# Patient Record
Sex: FEMALE | Race: OTHER | NOT HISPANIC OR LATINO | URBAN - METROPOLITAN AREA
[De-identification: names, ages, dates, MRNs, and addresses within clinical notes are randomized per-mention and may not be internally consistent; named-entity substitution may affect disease eponyms.]

---

## 2018-01-04 ENCOUNTER — INPATIENT (INPATIENT)
Facility: HOSPITAL | Age: 83
LOS: 4 days | Discharge: NURSING, REHAB OR LONG-TERM CARE | DRG: 872 | End: 2018-01-09
Attending: INTERNAL MEDICINE | Admitting: INTERNAL MEDICINE
Payer: MEDICARE

## 2018-01-04 VITALS
WEIGHT: 95.02 LBS | RESPIRATION RATE: 22 BRPM | DIASTOLIC BLOOD PRESSURE: 55 MMHG | HEART RATE: 90 BPM | SYSTOLIC BLOOD PRESSURE: 95 MMHG

## 2018-01-04 DIAGNOSIS — N39.0 URINARY TRACT INFECTION, SITE NOT SPECIFIED: ICD-10-CM

## 2018-01-04 DIAGNOSIS — E87.0 HYPEROSMOLALITY AND HYPERNATREMIA: ICD-10-CM

## 2018-01-04 DIAGNOSIS — E03.9 HYPOTHYROIDISM, UNSPECIFIED: ICD-10-CM

## 2018-01-04 DIAGNOSIS — Z29.9 ENCOUNTER FOR PROPHYLACTIC MEASURES, UNSPECIFIED: ICD-10-CM

## 2018-01-04 DIAGNOSIS — F03.90 UNSPECIFIED DEMENTIA WITHOUT BEHAVIORAL DISTURBANCE: ICD-10-CM

## 2018-01-04 DIAGNOSIS — J45.909 UNSPECIFIED ASTHMA, UNCOMPLICATED: ICD-10-CM

## 2018-01-04 DIAGNOSIS — G20 PARKINSON'S DISEASE: ICD-10-CM

## 2018-01-04 LAB
ALBUMIN SERPL ELPH-MCNC: 2.8 G/DL — LOW (ref 3.5–5)
ALP SERPL-CCNC: 68 U/L — SIGNIFICANT CHANGE UP (ref 40–120)
ALT FLD-CCNC: 54 U/L DA — SIGNIFICANT CHANGE UP (ref 10–60)
ANION GAP SERPL CALC-SCNC: 6 MMOL/L — SIGNIFICANT CHANGE UP (ref 5–17)
APPEARANCE UR: ABNORMAL
AST SERPL-CCNC: 52 U/L — HIGH (ref 10–40)
BASOPHILS # BLD AUTO: 0.1 K/UL — SIGNIFICANT CHANGE UP (ref 0–0.2)
BASOPHILS NFR BLD AUTO: 0.7 % — SIGNIFICANT CHANGE UP (ref 0–2)
BILIRUB SERPL-MCNC: 0.7 MG/DL — SIGNIFICANT CHANGE UP (ref 0.2–1.2)
BILIRUB UR-MCNC: NEGATIVE — SIGNIFICANT CHANGE UP
BUN SERPL-MCNC: 21 MG/DL — HIGH (ref 7–18)
BUN SERPL-MCNC: 26 MG/DL — HIGH (ref 7–18)
CALCIUM SERPL-MCNC: 7.5 MG/DL — LOW (ref 8.4–10.5)
CALCIUM SERPL-MCNC: 8.1 MG/DL — LOW (ref 8.4–10.5)
CHLORIDE SERPL-SCNC: 141 MMOL/L — HIGH (ref 96–108)
CHLORIDE SERPL-SCNC: 143 MMOL/L — HIGH (ref 96–108)
CO2 SERPL-SCNC: 24 MMOL/L — SIGNIFICANT CHANGE UP (ref 22–31)
CO2 SERPL-SCNC: 27 MMOL/L — SIGNIFICANT CHANGE UP (ref 22–31)
COLOR SPEC: YELLOW — SIGNIFICANT CHANGE UP
CREAT SERPL-MCNC: 1.18 MG/DL — SIGNIFICANT CHANGE UP (ref 0.5–1.3)
CREAT SERPL-MCNC: 1.37 MG/DL — HIGH (ref 0.5–1.3)
DIFF PNL FLD: ABNORMAL
EOSINOPHIL # BLD AUTO: 0.1 K/UL — SIGNIFICANT CHANGE UP (ref 0–0.5)
EOSINOPHIL NFR BLD AUTO: 1.8 % — SIGNIFICANT CHANGE UP (ref 0–6)
GLUCOSE SERPL-MCNC: 102 MG/DL — HIGH (ref 70–99)
GLUCOSE SERPL-MCNC: 123 MG/DL — HIGH (ref 70–99)
GLUCOSE UR QL: NEGATIVE — SIGNIFICANT CHANGE UP
HCT VFR BLD CALC: 46.4 % — HIGH (ref 34.5–45)
HGB BLD-MCNC: 13.8 G/DL — SIGNIFICANT CHANGE UP (ref 11.5–15.5)
KETONES UR-MCNC: NEGATIVE — SIGNIFICANT CHANGE UP
LACTATE SERPL-SCNC: 1.7 MMOL/L — SIGNIFICANT CHANGE UP (ref 0.7–2)
LACTATE SERPL-SCNC: 2.5 MMOL/L — HIGH (ref 0.7–2)
LEUKOCYTE ESTERASE UR-ACNC: ABNORMAL
LYMPHOCYTES # BLD AUTO: 1.4 K/UL — SIGNIFICANT CHANGE UP (ref 1–3.3)
LYMPHOCYTES # BLD AUTO: 17.4 % — SIGNIFICANT CHANGE UP (ref 13–44)
MCHC RBC-ENTMCNC: 29.8 GM/DL — LOW (ref 32–36)
MCHC RBC-ENTMCNC: 29.8 PG — SIGNIFICANT CHANGE UP (ref 27–34)
MCV RBC AUTO: 99.9 FL — SIGNIFICANT CHANGE UP (ref 80–100)
MONOCYTES # BLD AUTO: 0.4 K/UL — SIGNIFICANT CHANGE UP (ref 0–0.9)
MONOCYTES NFR BLD AUTO: 5 % — SIGNIFICANT CHANGE UP (ref 2–14)
NEUTROPHILS # BLD AUTO: 6 K/UL — SIGNIFICANT CHANGE UP (ref 1.8–7.4)
NEUTROPHILS NFR BLD AUTO: 75.2 % — SIGNIFICANT CHANGE UP (ref 43–77)
NITRITE UR-MCNC: POSITIVE
PH UR: 6 — SIGNIFICANT CHANGE UP (ref 5–8)
PLATELET # BLD AUTO: 111 K/UL — LOW (ref 150–400)
POTASSIUM SERPL-MCNC: 2.9 MMOL/L — CRITICAL LOW (ref 3.5–5.3)
POTASSIUM SERPL-MCNC: 3.2 MMOL/L — LOW (ref 3.5–5.3)
POTASSIUM SERPL-SCNC: 2.9 MMOL/L — CRITICAL LOW (ref 3.5–5.3)
POTASSIUM SERPL-SCNC: 3.2 MMOL/L — LOW (ref 3.5–5.3)
PROT SERPL-MCNC: 7.2 G/DL — SIGNIFICANT CHANGE UP (ref 6–8.3)
PROT UR-MCNC: 100
RAPID RVP RESULT: SIGNIFICANT CHANGE UP
RBC # BLD: 4.64 M/UL — SIGNIFICANT CHANGE UP (ref 3.8–5.2)
RBC # FLD: 14.9 % — HIGH (ref 10.3–14.5)
SODIUM SERPL-SCNC: 174 MMOL/L — CRITICAL HIGH (ref 135–145)
SODIUM SERPL-SCNC: 176 MMOL/L — CRITICAL HIGH (ref 135–145)
SP GR SPEC: 1.01 — SIGNIFICANT CHANGE UP (ref 1.01–1.02)
UROBILINOGEN FLD QL: NEGATIVE — SIGNIFICANT CHANGE UP
WBC # BLD: 8 K/UL — SIGNIFICANT CHANGE UP (ref 3.8–10.5)
WBC # FLD AUTO: 8 K/UL — SIGNIFICANT CHANGE UP (ref 3.8–10.5)

## 2018-01-04 PROCEDURE — 99285 EMERGENCY DEPT VISIT HI MDM: CPT

## 2018-01-04 PROCEDURE — 71045 X-RAY EXAM CHEST 1 VIEW: CPT | Mod: 26

## 2018-01-04 RX ORDER — SODIUM CHLORIDE 9 MG/ML
900 INJECTION INTRAMUSCULAR; INTRAVENOUS; SUBCUTANEOUS ONCE
Qty: 0 | Refills: 0 | Status: COMPLETED | OUTPATIENT
Start: 2018-01-04 | End: 2018-01-04

## 2018-01-04 RX ORDER — HEPARIN SODIUM 5000 [USP'U]/ML
5000 INJECTION INTRAVENOUS; SUBCUTANEOUS EVERY 12 HOURS
Qty: 0 | Refills: 0 | Status: DISCONTINUED | OUTPATIENT
Start: 2018-01-04 | End: 2018-01-09

## 2018-01-04 RX ORDER — LEVOTHYROXINE SODIUM 125 MCG
25 TABLET ORAL DAILY
Qty: 0 | Refills: 0 | Status: DISCONTINUED | OUTPATIENT
Start: 2018-01-04 | End: 2018-01-09

## 2018-01-04 RX ORDER — TRAZODONE HCL 50 MG
0.5 TABLET ORAL
Qty: 0 | Refills: 0 | COMMUNITY

## 2018-01-04 RX ORDER — TRAZODONE HCL 50 MG
25 TABLET ORAL AT BEDTIME
Qty: 0 | Refills: 0 | Status: DISCONTINUED | OUTPATIENT
Start: 2018-01-04 | End: 2018-01-09

## 2018-01-04 RX ORDER — CARBIDOPA AND LEVODOPA 25; 100 MG/1; MG/1
1 TABLET ORAL
Qty: 0 | Refills: 0 | COMMUNITY

## 2018-01-04 RX ORDER — LEVOTHYROXINE SODIUM 125 MCG
1 TABLET ORAL
Qty: 0 | Refills: 0 | COMMUNITY

## 2018-01-04 RX ORDER — CALCIPOTRIENE 50 UG/G
1 CREAM TOPICAL
Qty: 0 | Refills: 0 | COMMUNITY

## 2018-01-04 RX ORDER — CARBIDOPA AND LEVODOPA 25; 100 MG/1; MG/1
1 TABLET ORAL THREE TIMES A DAY
Qty: 0 | Refills: 0 | Status: DISCONTINUED | OUTPATIENT
Start: 2018-01-04 | End: 2018-01-09

## 2018-01-04 RX ORDER — SODIUM CHLORIDE 9 MG/ML
1000 INJECTION, SOLUTION INTRAVENOUS
Qty: 0 | Refills: 0 | Status: DISCONTINUED | OUTPATIENT
Start: 2018-01-04 | End: 2018-01-05

## 2018-01-04 RX ORDER — MEMANTINE HYDROCHLORIDE 10 MG/1
5 TABLET ORAL
Qty: 0 | Refills: 0 | Status: DISCONTINUED | OUTPATIENT
Start: 2018-01-04 | End: 2018-01-09

## 2018-01-04 RX ORDER — MOMETASONE FUROATE 1 MG/G
1 CREAM TOPICAL
Qty: 0 | Refills: 0 | COMMUNITY

## 2018-01-04 RX ORDER — KETOCONAZOLE 20 MG/G
1 AEROSOL, FOAM TOPICAL
Qty: 0 | Refills: 0 | COMMUNITY

## 2018-01-04 RX ORDER — ALBUTEROL 90 UG/1
3 AEROSOL, METERED ORAL
Qty: 0 | Refills: 0 | COMMUNITY

## 2018-01-04 RX ORDER — IPRATROPIUM/ALBUTEROL SULFATE 18-103MCG
3 AEROSOL WITH ADAPTER (GRAM) INHALATION EVERY 6 HOURS
Qty: 0 | Refills: 0 | Status: DISCONTINUED | OUTPATIENT
Start: 2018-01-04 | End: 2018-01-09

## 2018-01-04 RX ORDER — MEMANTINE HYDROCHLORIDE 10 MG/1
1 TABLET ORAL
Qty: 0 | Refills: 0 | COMMUNITY

## 2018-01-04 RX ORDER — ACETAMINOPHEN 500 MG
650 TABLET ORAL ONCE
Qty: 0 | Refills: 0 | Status: COMPLETED | OUTPATIENT
Start: 2018-01-04 | End: 2018-01-04

## 2018-01-04 RX ORDER — PIPERACILLIN AND TAZOBACTAM 4; .5 G/20ML; G/20ML
3.38 INJECTION, POWDER, LYOPHILIZED, FOR SOLUTION INTRAVENOUS ONCE
Qty: 0 | Refills: 0 | Status: COMPLETED | OUTPATIENT
Start: 2018-01-04 | End: 2018-01-04

## 2018-01-04 RX ORDER — FLUOCINONIDE/EMOLLIENT BASE 0.05 %
1 CREAM (GRAM) TOPICAL
Qty: 0 | Refills: 0 | Status: DISCONTINUED | OUTPATIENT
Start: 2018-01-04 | End: 2018-01-09

## 2018-01-04 RX ORDER — POTASSIUM CHLORIDE 20 MEQ
40 PACKET (EA) ORAL EVERY 4 HOURS
Qty: 0 | Refills: 0 | Status: DISCONTINUED | OUTPATIENT
Start: 2018-01-04 | End: 2018-01-05

## 2018-01-04 RX ORDER — CEFTRIAXONE 500 MG/1
1 INJECTION, POWDER, FOR SOLUTION INTRAMUSCULAR; INTRAVENOUS EVERY 24 HOURS
Qty: 0 | Refills: 0 | Status: DISCONTINUED | OUTPATIENT
Start: 2018-01-04 | End: 2018-01-09

## 2018-01-04 RX ORDER — DEXTROSE MONOHYDRATE, SODIUM CHLORIDE, AND POTASSIUM CHLORIDE 50; .745; 4.5 G/1000ML; G/1000ML; G/1000ML
1000 INJECTION, SOLUTION INTRAVENOUS
Qty: 0 | Refills: 0 | Status: DISCONTINUED | OUTPATIENT
Start: 2018-01-04 | End: 2018-01-04

## 2018-01-04 RX ADMIN — SODIUM CHLORIDE 900 MILLILITER(S): 9 INJECTION INTRAMUSCULAR; INTRAVENOUS; SUBCUTANEOUS at 15:35

## 2018-01-04 RX ADMIN — Medication 40 MILLIEQUIVALENT(S): at 18:35

## 2018-01-04 RX ADMIN — CARBIDOPA AND LEVODOPA 1 TABLET(S): 25; 100 TABLET ORAL at 23:56

## 2018-01-04 RX ADMIN — Medication 650 MILLIGRAM(S): at 16:57

## 2018-01-04 RX ADMIN — SODIUM CHLORIDE 75 MILLILITER(S): 9 INJECTION, SOLUTION INTRAVENOUS at 22:46

## 2018-01-04 RX ADMIN — CEFTRIAXONE 100 GRAM(S): 500 INJECTION, POWDER, FOR SOLUTION INTRAMUSCULAR; INTRAVENOUS at 23:57

## 2018-01-04 RX ADMIN — Medication 25 MILLIGRAM(S): at 23:55

## 2018-01-04 RX ADMIN — PIPERACILLIN AND TAZOBACTAM 100 GRAM(S): 4; .5 INJECTION, POWDER, LYOPHILIZED, FOR SOLUTION INTRAVENOUS at 18:32

## 2018-01-04 NOTE — ED PROVIDER NOTE - MEDICAL DECISION MAKING DETAILS
Pt w/ hypoxia, below 90 on room air. Will admit for infectious workup and CT for head trauma. elevated sodium and chloride, elevated BUN/Cr. IV fluids given, rectal temperature elevated. levquin given.

## 2018-01-04 NOTE — H&P ADULT - PROBLEM SELECTOR PLAN 1
Patient presented with lethargy and weakness with UA positive for WBC 26-50 with many bacteria, positive nitrites and leucocyte esterase.   Lactate 2.5 s/p 900 ml NS bolus improved to 1.7  Continue Rocephin and follow urine cultures

## 2018-01-04 NOTE — ED PROVIDER NOTE - OBJECTIVE STATEMENT
89 y/o F pt sent from nursing home after falling out of her wheelchair today. Pt also noted to have low oxygen saturation for the past few days. Pt has dementia and is unable to provide further history. NKDA.

## 2018-01-04 NOTE — H&P ADULT - ASSESSMENT
89 F from [facility] with PMH of OA, b12 deficiency hypothyroid, asthma, depression, parkinsonism, alzheimer's psoriasis and hypernatremia was sent from NH to ED for lethargy and weakness from last few days, assoicated with poor appetite.

## 2018-01-04 NOTE — H&P ADULT - PROBLEM SELECTOR PLAN 2
Na is 174, secondary to dehydration secondary to sepsis  Free water deficit is 3L. Patient already received 900 ml NS bolus. Started D5 at 75 ml/hr as per medical attending. Follow BMP q8. Na is 174, secondary to dehydration secondary to sepsis  Free water deficit is 3L. Patient already received 900 ml NS bolus. Started D5 at 75 ml/hr as per medical attending. Follow BMP q8. Nephro: Dr Matamoros

## 2018-01-04 NOTE — ED PROVIDER NOTE - CARE PLAN
Principal Discharge DX:	Hypernatremia  Secondary Diagnosis:	Dehydration  Secondary Diagnosis:	UTI (urinary tract infection)

## 2018-01-04 NOTE — ED PROVIDER NOTE - SKIN, MLM
Skin normal color for race, warm, dry and intact. Skin tear to R upper arm, contusion to L forehead.

## 2018-01-04 NOTE — H&P ADULT - HISTORY OF PRESENT ILLNESS
89 F with PMH of OA, b12 defiency, hypothroid, asthma, depression, parkinsonism, alzeimers, psoriasis, hypernatraemia, 89 F from [facility] with PMH of OA, b12 deficiency hypothyroid, asthma, depression, parkinsonism, alzheimer's psoriasis and hypernatremia was sent from NH to ED for lethargy and weakness from last few days, assoicated with poor appetite. Patient was sitting in wheel chair and she fell off the chair. ROS is difficult to obtain as patient is AxO X0, not able to answer the questions. No evidence of fever, cough, constipation, diarrhea or pedal edema. Patient has bruises all over the body likely chronic. Sh: Unable to obtain. FH: Unable to obtain. In ED patient was found to have Na 174 s/p 900 ml NS bolus. Na increased to 176. UA is positive for UTI.

## 2018-01-05 DIAGNOSIS — N17.9 ACUTE KIDNEY FAILURE, UNSPECIFIED: ICD-10-CM

## 2018-01-05 DIAGNOSIS — E87.6 HYPOKALEMIA: ICD-10-CM

## 2018-01-05 DIAGNOSIS — N18.9 CHRONIC KIDNEY DISEASE, UNSPECIFIED: ICD-10-CM

## 2018-01-05 LAB
ALBUMIN SERPL ELPH-MCNC: 2.7 G/DL — LOW (ref 3.5–5)
ALP SERPL-CCNC: 68 U/L — SIGNIFICANT CHANGE UP (ref 40–120)
ALT FLD-CCNC: 20 U/L DA — SIGNIFICANT CHANGE UP (ref 10–60)
ANION GAP SERPL CALC-SCNC: 5 MMOL/L — SIGNIFICANT CHANGE UP (ref 5–17)
ANION GAP SERPL CALC-SCNC: 6 MMOL/L — SIGNIFICANT CHANGE UP (ref 5–17)
ANION GAP SERPL CALC-SCNC: 8 MMOL/L — SIGNIFICANT CHANGE UP (ref 5–17)
AST SERPL-CCNC: 52 U/L — HIGH (ref 10–40)
BASOPHILS # BLD AUTO: 0 K/UL — SIGNIFICANT CHANGE UP (ref 0–0.2)
BASOPHILS NFR BLD AUTO: 0.5 % — SIGNIFICANT CHANGE UP (ref 0–2)
BILIRUB SERPL-MCNC: 0.8 MG/DL — SIGNIFICANT CHANGE UP (ref 0.2–1.2)
BUN SERPL-MCNC: 16 MG/DL — SIGNIFICANT CHANGE UP (ref 7–18)
BUN SERPL-MCNC: 21 MG/DL — HIGH (ref 7–18)
BUN SERPL-MCNC: 25 MG/DL — HIGH (ref 7–18)
CALCIUM SERPL-MCNC: 7.7 MG/DL — LOW (ref 8.4–10.5)
CALCIUM SERPL-MCNC: 7.7 MG/DL — LOW (ref 8.4–10.5)
CALCIUM SERPL-MCNC: 7.8 MG/DL — LOW (ref 8.4–10.5)
CHLORIDE SERPL-SCNC: 138 MMOL/L — HIGH (ref 96–108)
CHLORIDE SERPL-SCNC: 140 MMOL/L — HIGH (ref 96–108)
CHLORIDE SERPL-SCNC: 141 MMOL/L — HIGH (ref 96–108)
CHOLEST SERPL-MCNC: 133 MG/DL — SIGNIFICANT CHANGE UP (ref 10–199)
CO2 SERPL-SCNC: 23 MMOL/L — SIGNIFICANT CHANGE UP (ref 22–31)
CO2 SERPL-SCNC: 26 MMOL/L — SIGNIFICANT CHANGE UP (ref 22–31)
CO2 SERPL-SCNC: 27 MMOL/L — SIGNIFICANT CHANGE UP (ref 22–31)
CREAT SERPL-MCNC: 1.05 MG/DL — SIGNIFICANT CHANGE UP (ref 0.5–1.3)
CREAT SERPL-MCNC: 1.3 MG/DL — SIGNIFICANT CHANGE UP (ref 0.5–1.3)
CREAT SERPL-MCNC: 1.37 MG/DL — HIGH (ref 0.5–1.3)
CULTURE RESULTS: SIGNIFICANT CHANGE UP
EOSINOPHIL # BLD AUTO: 0.2 K/UL — SIGNIFICANT CHANGE UP (ref 0–0.5)
EOSINOPHIL NFR BLD AUTO: 2.3 % — SIGNIFICANT CHANGE UP (ref 0–6)
GLUCOSE SERPL-MCNC: 109 MG/DL — HIGH (ref 70–99)
GLUCOSE SERPL-MCNC: 125 MG/DL — HIGH (ref 70–99)
GLUCOSE SERPL-MCNC: 140 MG/DL — HIGH (ref 70–99)
HBA1C BLD-MCNC: 6.2 % — HIGH (ref 4–5.6)
HCT VFR BLD CALC: 44.1 % — SIGNIFICANT CHANGE UP (ref 34.5–45)
HDLC SERPL-MCNC: 28 MG/DL — LOW (ref 40–125)
HGB BLD-MCNC: 13.2 G/DL — SIGNIFICANT CHANGE UP (ref 11.5–15.5)
LIPID PNL WITH DIRECT LDL SERPL: 70 MG/DL — SIGNIFICANT CHANGE UP
LYMPHOCYTES # BLD AUTO: 1.3 K/UL — SIGNIFICANT CHANGE UP (ref 1–3.3)
LYMPHOCYTES # BLD AUTO: 16.7 % — SIGNIFICANT CHANGE UP (ref 13–44)
MAGNESIUM SERPL-MCNC: 2.7 MG/DL — HIGH (ref 1.6–2.6)
MCHC RBC-ENTMCNC: 29.6 PG — SIGNIFICANT CHANGE UP (ref 27–34)
MCHC RBC-ENTMCNC: 29.9 GM/DL — LOW (ref 32–36)
MCV RBC AUTO: 99.1 FL — SIGNIFICANT CHANGE UP (ref 80–100)
MONOCYTES # BLD AUTO: 0.4 K/UL — SIGNIFICANT CHANGE UP (ref 0–0.9)
MONOCYTES NFR BLD AUTO: 4.5 % — SIGNIFICANT CHANGE UP (ref 2–14)
NEUTROPHILS # BLD AUTO: 6 K/UL — SIGNIFICANT CHANGE UP (ref 1.8–7.4)
NEUTROPHILS NFR BLD AUTO: 76 % — SIGNIFICANT CHANGE UP (ref 43–77)
OSMOLALITY SERPL: 364 MOS/KG — HIGH (ref 275–300)
PHOSPHATE SERPL-MCNC: 1.4 MG/DL — LOW (ref 2.5–4.5)
PLATELET # BLD AUTO: 106 K/UL — LOW (ref 150–400)
POTASSIUM SERPL-MCNC: 3.4 MMOL/L — LOW (ref 3.5–5.3)
POTASSIUM SERPL-MCNC: 3.7 MMOL/L — SIGNIFICANT CHANGE UP (ref 3.5–5.3)
POTASSIUM SERPL-MCNC: 3.9 MMOL/L — SIGNIFICANT CHANGE UP (ref 3.5–5.3)
POTASSIUM SERPL-SCNC: 3.4 MMOL/L — LOW (ref 3.5–5.3)
POTASSIUM SERPL-SCNC: 3.7 MMOL/L — SIGNIFICANT CHANGE UP (ref 3.5–5.3)
POTASSIUM SERPL-SCNC: 3.9 MMOL/L — SIGNIFICANT CHANGE UP (ref 3.5–5.3)
PROT SERPL-MCNC: 6.9 G/DL — SIGNIFICANT CHANGE UP (ref 6–8.3)
RBC # BLD: 4.45 M/UL — SIGNIFICANT CHANGE UP (ref 3.8–5.2)
RBC # FLD: 14.6 % — HIGH (ref 10.3–14.5)
SODIUM SERPL-SCNC: 169 MMOL/L — CRITICAL HIGH (ref 135–145)
SODIUM SERPL-SCNC: 171 MMOL/L — CRITICAL HIGH (ref 135–145)
SODIUM SERPL-SCNC: 174 MMOL/L — CRITICAL HIGH (ref 135–145)
SPECIMEN SOURCE: SIGNIFICANT CHANGE UP
TOTAL CHOLESTEROL/HDL RATIO MEASUREMENT: 4.8 RATIO — SIGNIFICANT CHANGE UP (ref 3.3–7.1)
TRIGL SERPL-MCNC: 175 MG/DL — HIGH (ref 10–149)
TSH SERPL-MCNC: 4.01 UU/ML — SIGNIFICANT CHANGE UP (ref 0.34–4.82)
VIT B12 SERPL-MCNC: 636 PG/ML — SIGNIFICANT CHANGE UP (ref 232–1245)
WBC # BLD: 7.9 K/UL — SIGNIFICANT CHANGE UP (ref 3.8–10.5)
WBC # FLD AUTO: 7.9 K/UL — SIGNIFICANT CHANGE UP (ref 3.8–10.5)

## 2018-01-05 PROCEDURE — 70450 CT HEAD/BRAIN W/O DYE: CPT | Mod: 26

## 2018-01-05 RX ORDER — SODIUM CHLORIDE 9 MG/ML
1000 INJECTION, SOLUTION INTRAVENOUS
Qty: 0 | Refills: 0 | Status: DISCONTINUED | OUTPATIENT
Start: 2018-01-05 | End: 2018-01-06

## 2018-01-05 RX ORDER — POTASSIUM CHLORIDE 20 MEQ
40 PACKET (EA) ORAL EVERY 4 HOURS
Qty: 0 | Refills: 0 | Status: COMPLETED | OUTPATIENT
Start: 2018-01-05 | End: 2018-01-05

## 2018-01-05 RX ORDER — POTASSIUM PHOSPHATE, MONOBASIC POTASSIUM PHOSPHATE, DIBASIC 236; 224 MG/ML; MG/ML
30 INJECTION, SOLUTION INTRAVENOUS ONCE
Qty: 0 | Refills: 0 | Status: COMPLETED | OUTPATIENT
Start: 2018-01-05 | End: 2018-01-05

## 2018-01-05 RX ADMIN — Medication 3 MILLILITER(S): at 10:03

## 2018-01-05 RX ADMIN — CEFTRIAXONE 100 GRAM(S): 500 INJECTION, POWDER, FOR SOLUTION INTRAMUSCULAR; INTRAVENOUS at 23:57

## 2018-01-05 RX ADMIN — HEPARIN SODIUM 5000 UNIT(S): 5000 INJECTION INTRAVENOUS; SUBCUTANEOUS at 06:27

## 2018-01-05 RX ADMIN — POTASSIUM PHOSPHATE, MONOBASIC POTASSIUM PHOSPHATE, DIBASIC 85 MILLIMOLE(S): 236; 224 INJECTION, SOLUTION INTRAVENOUS at 12:29

## 2018-01-05 RX ADMIN — MEMANTINE HYDROCHLORIDE 5 MILLIGRAM(S): 10 TABLET ORAL at 17:17

## 2018-01-05 RX ADMIN — MEMANTINE HYDROCHLORIDE 5 MILLIGRAM(S): 10 TABLET ORAL at 06:27

## 2018-01-05 RX ADMIN — SODIUM CHLORIDE 75 MILLILITER(S): 9 INJECTION, SOLUTION INTRAVENOUS at 19:28

## 2018-01-05 RX ADMIN — Medication 1 APPLICATION(S): at 06:27

## 2018-01-05 RX ADMIN — CARBIDOPA AND LEVODOPA 1 TABLET(S): 25; 100 TABLET ORAL at 22:00

## 2018-01-05 RX ADMIN — CARBIDOPA AND LEVODOPA 1 TABLET(S): 25; 100 TABLET ORAL at 12:30

## 2018-01-05 RX ADMIN — Medication 3 MILLILITER(S): at 02:42

## 2018-01-05 RX ADMIN — Medication 40 MILLIEQUIVALENT(S): at 06:27

## 2018-01-05 RX ADMIN — Medication 25 MICROGRAM(S): at 06:30

## 2018-01-05 RX ADMIN — HEPARIN SODIUM 5000 UNIT(S): 5000 INJECTION INTRAVENOUS; SUBCUTANEOUS at 17:17

## 2018-01-05 RX ADMIN — Medication 1 APPLICATION(S): at 17:17

## 2018-01-05 RX ADMIN — Medication 25 MILLIGRAM(S): at 22:00

## 2018-01-05 RX ADMIN — Medication 1 APPLICATION(S): at 17:16

## 2018-01-05 RX ADMIN — Medication 40 MILLIEQUIVALENT(S): at 01:18

## 2018-01-05 RX ADMIN — SODIUM CHLORIDE 100 MILLILITER(S): 9 INJECTION, SOLUTION INTRAVENOUS at 21:58

## 2018-01-05 NOTE — CONSULT NOTE ADULT - PROBLEM SELECTOR RECOMMENDATION 3
r/o secondary to htn/vascular ds with mild proteinuria  -Keep patient euvolemic and renal diet  -Avoid Nephrotoxic Meds/ Agents such as (NSAIDs, IV contrast, Aminoglycosides such as gentamicin, -Gadolinium contrast, Phosphate containing enemas, etc..)  -Adjust Medications according to eGFR  -no further w/u needed

## 2018-01-05 NOTE — ADVANCED PRACTICE NURSE CONSULT - ASSESSMENT
This is a 89yr old female patient admitted for hypernatremia, presenting with the following:  -There is evidence of dermatological lesions on the patients Lower Back, Upper Back, and Bilateral Gluteus  -There is a Stage 2 Pressure Injury to the L. (0.5cm x 0.2cm) and R. (0.4cm x 0.3cm) Gluteus with red tissue and scant drainage present  -There is evidence of ecchymosis to the Bilateral Upper and Lower Extremities  -There is blanchable erythema to the Bilateral Heels This is a 89yr old female patient admitted for hypernatremia, presenting with the following:  -There is evidence of dermatological lesions on the patients Lower Back, Upper Back, and Bilateral Gluteus  -There is a Stage 2 Pressure Injury to the L. (0.5cm x 0.2cm) and R. (0.4cm x 0.3cm) Gluteus with red tissue and scant drainage present  -There is evidence of ecchymosis to the Bilateral Upper and Lower Extremities  -There is blanchable erythema to the Bilateral Heels and Coccyx

## 2018-01-05 NOTE — CONSULT NOTE ADULT - PROBLEM SELECTOR RECOMMENDATION 9
r/o acute on chronic vs chronic-duration is uk  -secondary to poor po intake/failure to thrive plus sespis induced, now Na level is mildly better on D5W   -suggest to f/u Na level q 12hrs and adjust iv rate as per Na level  -do not correct >8 meq per day  -please call me with NA LEVEL Q 12HRS

## 2018-01-05 NOTE — SWALLOW BEDSIDE ASSESSMENT ADULT - SWALLOW EVAL: DIAGNOSIS
Pt p/w s&s oropharyngeal dysphagia c/b slow bolus formation & manipulation, slow A-P transport, delayed swallow reflex, reduced hyoid excursion palpated, & delayed cough post swallow on thin liquids at this exam. Suspected premature spillage of to the hypopharynx.

## 2018-01-05 NOTE — SWALLOW BEDSIDE ASSESSMENT ADULT - ASR SWALLOW ASPIRATION MONITOR
oral hygiene/position upright (89Y)/upper respiratory infection/gurgly voice/pneumonia/change of breathing pattern/cough/fever/throat clearing

## 2018-01-05 NOTE — PROGRESS NOTE ADULT - ASSESSMENT
89 F from [facility] with PMH of OA, b12 deficiency hypothyroid, asthma, depression, parkinsonism, alzheimer's psoriasis and hypernatremia was sent from NH to ED for lethargy and weakness from last few days, associated with poor appetite.    Admitted for Hypernatremia and UTI

## 2018-01-05 NOTE — CONSULT NOTE ADULT - SUBJECTIVE AND OBJECTIVE BOX
Patient is a 89y Female whom presented to the hospital with worsening AMS/weakness , noted to have severe Hypernatremia and lucy    Medical HPI:  89 F from [facility] with PMH of OA, b12 deficiency hypothyroid, asthma, depression, parkinsonism, alzheimer's psoriasis and hypernatremia was sent from NH to ED for lethargy and weakness from last few days, assoicated with poor appetite. Patient was sitting in wheel chair and she fell off the chair. ROS is difficult to obtain as patient is AxO X0, not able to answer the questions. No evidence of fever, cough, constipation, diarrhea or pedal edema. Patient has bruises all over the body likely chronic. Sh: Unable to obtain. FH: Unable to obtain. In ED patient was found to have Na 174 s/p 900 ml NS bolus. Na increased to 176. UA is positive for UTI. (2018 19:43)    Renal HPI:  pts current chart reviewed and case discussed with resident  pt with ams/dementia and unable to get any renal HPI  pts baseline serum cr or stage of ckd is unknown at this time    PAST MEDICAL & SURGICAL HISTORY:  Dementia  No significant past surgical history      Home Medications: Reviewed    MEDICATIONS  (STANDING):  ALBUTerol/ipratropium for Nebulization 3 milliLiter(s) Nebulizer every 6 hours  betamethasone valerate 0.1% Cream 1 Application(s) Topical two times a day  carbidopa/levodopa  25/100 1 Tablet(s) Oral three times a day  cefTRIAXone   IVPB 1 Gram(s) IV Intermittent every 24 hours  dextrose 5%. 1000 milliLiter(s) (75 mL/Hr) IV Continuous <Continuous>  fluocinonide 0.05% Cream 1 Application(s) Topical two times a day  heparin  Injectable 5000 Unit(s) SubCutaneous every 12 hours  levothyroxine 25 MICROGram(s) Oral daily  memantine 5 milliGRAM(s) Oral two times a day  traZODone 25 milliGRAM(s) Oral at bedtime    MEDICATIONS  (PRN):      Allergies    No Known Allergies    Intolerances        SOCIAL HISTORY:unknown  NHR    FAMILY HISTORY:n/c      REVIEW OF SYSTEMS:  Unobtainable    Vital Signs  T(F): 97 (18 @ 13:33), Max: 98.6 (18 @ 22:29)  HR: 84 (18 @ 13:33) (81 - 111)  BP: 98/65 (18 @ 13:33) (97/57 - 144/92)  ABP: --  RR: 16 (18 @ 13:33) (16 - 18)  SpO2: 94% (18 @ 13:33) (93% - 95%)  Wt(kg): --  CVP(cm H2O): --  CO: --  PCWP: --    I and O's:    Daily     Daily Weight in k.6 (2018 23:24)    PHYSICAL EXAM:  Constitutional: well developed, Mal-nourished  and in nad  HEENT: PERRLA,  no icteric sclera and mild pallor of conjunctiva noted  Neck: No JVD, thyromegaly or adenopathy  Respiratory: reduced air entry lower lungs with no rales, wheezing or rhonchi  Cardiovascular: S1 and S2 normally heard  Gastrointestinal: soft, nondistended, nontender and normal bowel sounds heard  Extremities: No peripheral edema or cyanosis  Neurological: arousable, non verbal , non focal on gross exam  : No flank tenderness  Skin: No rashes        LABS:                        13.2   7.9   )-----------( 106      ( 2018 07:45 )             44.1     1.4  --            171<HH>  |  140<H>  |  21<H>  ----------------------------<  125<H>  3.9   |  26  |  1.30      174<HH>  |  141<H>  |  25<H>  ----------------------------<  140<H>  3.4<L>   |  27  |  1.37<H>      176<HH>  |  143<H>  |  21<H>  ----------------------------<  102<H>  3.2<L>   |  24  |  1.18      174<HH>  |  141<H>  |  26<H>  ----------------------------<  123<H>  2.9<LL>   |  27  |  1.37<H>    Ca    7.8<L>      2018 07:45  Ca    7.7<L>      2018 00:15  Ca    7.5<L>      2018 17:15  Ca    8.1<L>      2018 14:25  Phos  1.4       Mg     2.7         TPro  6.9  /  Alb  2.7<L>  /  TBili  0.8  /  DBili  x   /  AST  52<H>  /  ALT  20  /  AlkPhos  68    TPro  7.2  /  Alb  2.8<L>  /  TBili  0.7  /  DBili  x   /  AST  52<H>  /  ALT  54  /  AlkPhos  68            URINE STUDIES:  Urinalysis Basic - ( 2018 14:58 )    Color: Yellow / Appearance: Slightly Turbid / S.010 / pH: x  Gluc: x / Ketone: Negative  / Bili: Negative / Urobili: Negative   Blood: x / Protein: 100 / Nitrite: Positive   Leuk Esterase: Moderate / RBC: 2-5 /HPF / WBC 26-50 /HPF   Sq Epi: x / Non Sq Epi: Moderate /HPF / Bacteria: Many /HPF                      RADIOLOGY & ADDITIONAL STUDIES:    < from: Xray Chest 1 View AP -PORTABLE-Routine (18 @ 16:33) >  EXAM:  XR CHEST PORTABLE  ROUTINE 1V                            PROCEDURE DATE:  2018          INTERPRETATION:  AP semierect chest on 2018 at 3:50 PM.   Patient has low oxygen saturation and agitation.    COMPARISON: None available.    Heart is magnified by technique. The aorta is quite uncoiled.    There are no definable infiltrates.    IMPRESSION: Uncoiled aorta.    < end of copied text > Patient is a 89y Female whom presented to the hospital with worsening AMS/weakness , noted to have severe Hypernatremia, hypokalemia  and lucy    Medical HPI:  89 F from [facility] with PMH of OA, b12 deficiency hypothyroid, asthma, depression, parkinsonism, alzheimer's psoriasis and hypernatremia was sent from NH to ED for lethargy and weakness from last few days, assoicated with poor appetite. Patient was sitting in wheel chair and she fell off the chair. ROS is difficult to obtain as patient is AxO X0, not able to answer the questions. No evidence of fever, cough, constipation, diarrhea or pedal edema. Patient has bruises all over the body likely chronic. Sh: Unable to obtain. FH: Unable to obtain. In ED patient was found to have Na 174 s/p 900 ml NS bolus. Na increased to 176. UA is positive for UTI. (2018 19:43)    Renal HPI:  pts current chart reviewed and case discussed with resident  pt with ams/dementia and unable to get any renal HPI  pts baseline serum cr or stage of ckd is unknown at this time    PAST MEDICAL & SURGICAL HISTORY:  Dementia  No significant past surgical history      Home Medications: Reviewed    MEDICATIONS  (STANDING):  ALBUTerol/ipratropium for Nebulization 3 milliLiter(s) Nebulizer every 6 hours  betamethasone valerate 0.1% Cream 1 Application(s) Topical two times a day  carbidopa/levodopa  25/100 1 Tablet(s) Oral three times a day  cefTRIAXone   IVPB 1 Gram(s) IV Intermittent every 24 hours  dextrose 5%. 1000 milliLiter(s) (75 mL/Hr) IV Continuous <Continuous>  fluocinonide 0.05% Cream 1 Application(s) Topical two times a day  heparin  Injectable 5000 Unit(s) SubCutaneous every 12 hours  levothyroxine 25 MICROGram(s) Oral daily  memantine 5 milliGRAM(s) Oral two times a day  traZODone 25 milliGRAM(s) Oral at bedtime    MEDICATIONS  (PRN):      Allergies    No Known Allergies    Intolerances        SOCIAL HISTORY:unknown  NHR    FAMILY HISTORY:n/c      REVIEW OF SYSTEMS:  Unobtainable    Vital Signs  T(F): 97 (18 @ 13:33), Max: 98.6 (18 @ 22:29)  HR: 84 (18 @ 13:33) (81 - 111)  BP: 98/65 (18 @ 13:33) (97/57 - 144/92)  ABP: --  RR: 16 (18 @ 13:33) (16 - 18)  SpO2: 94% (18 @ 13:33) (93% - 95%)  Wt(kg): --  CVP(cm H2O): --  CO: --  PCWP: --    I and O's:    Daily     Daily Weight in k.6 (2018 23:24)    PHYSICAL EXAM:  Constitutional: well developed, Mal-nourished  and in nad  HEENT: PERRLA,  no icteric sclera and mild pallor of conjunctiva noted  Neck: No JVD, thyromegaly or adenopathy  Respiratory: reduced air entry lower lungs with no rales, wheezing or rhonchi  Cardiovascular: S1 and S2 normally heard  Gastrointestinal: soft, nondistended, nontender and normal bowel sounds heard  Extremities: No peripheral edema or cyanosis  Neurological: arousable, non verbal , non focal on gross exam  : No flank tenderness  Skin: No rashes        LABS:                        13.2   7.9   )-----------( 106      ( 2018 07:45 )             44.1     1.4  --            171<HH>  |  140<H>  |  21<H>  ----------------------------<  125<H>  3.9   |  26  |  1.30      174<HH>  |  141<H>  |  25<H>  ----------------------------<  140<H>  3.4<L>   |  27  |  1.37<H>      176<HH>  |  143<H>  |  21<H>  ----------------------------<  102<H>  3.2<L>   |  24  |  1.18      174<HH>  |  141<H>  |  26<H>  ----------------------------<  123<H>  2.9<LL>   |  27  |  1.37<H>    Ca    7.8<L>      2018 07:45  Ca    7.7<L>      2018 00:15  Ca    7.5<L>      2018 17:15  Ca    8.1<L>      2018 14:25  Phos  1.4       Mg     2.7         TPro  6.9  /  Alb  2.7<L>  /  TBili  0.8  /  DBili  x   /  AST  52<H>  /  ALT  20  /  AlkPhos  68    TPro  7.2  /  Alb  2.8<L>  /  TBili  0.7  /  DBili  x   /  AST  52<H>  /  ALT  54  /  AlkPhos  68            URINE STUDIES:  Urinalysis Basic - ( 2018 14:58 )    Color: Yellow / Appearance: Slightly Turbid / S.010 / pH: x  Gluc: x / Ketone: Negative  / Bili: Negative / Urobili: Negative   Blood: x / Protein: 100 / Nitrite: Positive   Leuk Esterase: Moderate / RBC: 2-5 /HPF / WBC 26-50 /HPF   Sq Epi: x / Non Sq Epi: Moderate /HPF / Bacteria: Many /HPF                      RADIOLOGY & ADDITIONAL STUDIES:    < from: Xray Chest 1 View AP -PORTABLE-Routine (18 @ 16:33) >  EXAM:  XR CHEST PORTABLE  ROUTINE 1V                            PROCEDURE DATE:  2018          INTERPRETATION:  AP semierect chest on 2018 at 3:50 PM.   Patient has low oxygen saturation and agitation.    COMPARISON: None available.    Heart is magnified by technique. The aorta is quite uncoiled.    There are no definable infiltrates.    IMPRESSION: Uncoiled aorta.    < end of copied text >

## 2018-01-05 NOTE — CONSULT NOTE ADULT - PROBLEM SELECTOR RECOMMENDATION 2
secondary to pre-renal azotemia with mild improvement last 24hrs  -Keep patient euvolemic and renal diet  -Avoid Nephrotoxic Meds/ Agents such as (NSAIDs, IV contrast, Aminoglycosides such as gentamicin, -Gadolinium contrast, Phosphate containing enemas, etc..)  -Adjust Medications according to eGFR  -f/u bmp daily

## 2018-01-05 NOTE — SWALLOW BEDSIDE ASSESSMENT ADULT - SLP PERTINENT HISTORY OF CURRENT PROBLEM
89 y/o F from [facility] with PMH of OA, B12 deficiency, hypothyroid, asthma, depression, Parkinsonism, Alzheimer's, psoriasis and hypernatremia was sent from NH to ED for s/p fall, lethargy and weakness x last few days, associated with poor appetite. Patient was sitting in wheel chair and she fell off the chair.

## 2018-01-05 NOTE — SWALLOW BEDSIDE ASSESSMENT ADULT - PHARYNGEAL PHASE
Delayed pharyngeal swallow/decreased hyolaryngeal elevation/Multiple swallows Multiple swallows/Delayed pharyngeal swallow/Cough post oral intake/decreased hyolaryngeal elevation

## 2018-01-05 NOTE — SWALLOW BEDSIDE ASSESSMENT ADULT - ORAL PHASE
Delayed oral transit time/Decreased anterior-posterior movement of the bolus Decreased anterior-posterior movement of the bolus Decreased anterior-posterior movement of the bolus/Delayed oral transit time/Stasis in lateral sulci

## 2018-01-05 NOTE — ADVANCED PRACTICE NURSE CONSULT - RECOMMEDATIONS
-Clean the Bilateral Gluteal wounds with normal saline and apply skin prep to the surrounding skin  -Apply a Foam dressing Q 72hrs PRN  -Please consider consulting a Dermatologist for further evaluation fo the patients skin  -Elevate/float the patients heels using heel protectors and reposition the patient Q 2hrs using wedges or pillows -Clean the Bilateral Gluteal wounds with normal saline and apply skin prep to the surrounding skin  -Apply a Foam dressing to the Coccyx and Bilateral Gluteal wounds Q 72hrs PRN  -Please consider consulting a Dermatologist for further evaluation fo the patients skin  -Elevate/float the patients heels using heel protectors and reposition the patient Q 2hrs using wedges or pillows

## 2018-01-05 NOTE — PROGRESS NOTE ADULT - SUBJECTIVE AND OBJECTIVE BOX
==================PGY 1 Note===================   Discussed with supervising resident and primary attending    ================CHIEF COMPLAINT===============  Patient is a 89y old  Female who presents with a chief complaint of Lethargy and weakness (2018 19:43)        =========INTERVAL HPI/OVERNIGHT EVENTS=========  Offers no new complaints. Patient is more active today      ============CURRENT MEDICATIONS===============    MEDICATIONS  (STANDING):  ALBUTerol/ipratropium for Nebulization 3 milliLiter(s) Nebulizer every 6 hours  betamethasone valerate 0.1% Cream 1 Application(s) Topical two times a day  carbidopa/levodopa  25/100 1 Tablet(s) Oral three times a day  cefTRIAXone   IVPB 1 Gram(s) IV Intermittent every 24 hours  dextrose 5%. 1000 milliLiter(s) (75 mL/Hr) IV Continuous <Continuous>  fluocinonide 0.05% Cream 1 Application(s) Topical two times a day  heparin  Injectable 5000 Unit(s) SubCutaneous every 12 hours  levothyroxine 25 MICROGram(s) Oral daily  memantine 5 milliGRAM(s) Oral two times a day  traZODone 25 milliGRAM(s) Oral at bedtime    MEDICATIONS  (PRN):        ============REVIEW OF SYSTEMS==================    CONSTITUTIONAL: No fever  EYES: no acute visual disturbances  NECK: No pain or stiffness  RESPIRATORY: No cough; No shortness of breath  CARDIOVASCULAR: No chest pain, no palpitations  GASTROINTESTINAL: No pain. No nausea or vomiting; No diarrhea   NEUROLOGICAL: No headache or numbness, no tremors  MUSCULOSKELETAL: No joint pain, no muscle pain  GENITOURINARY: no dysuria, no frequency, no hesitancy  PSYCHIATRY: no depression , no anxiety  ALL OTHER  ROS negative      ================VITALS SIGNS=====================  Vital Signs Last 24 Hrs  T(C): 36.8 (2018 05:35), Max: 38.3 (2018 14:05)  T(F): 98.2 (2018 05:35), Max: 100.9 (2018 14:05)  HR: 81 (2018 05:35) (81 - 111)  BP: 144/67 (2018 05:35) (95/55 - 144/92)  BP(mean): --  RR: 16 (2018 06:30) (16 - 22)  SpO2: 95% (2018 06:30) (93% - 100%)    ===============PHYSICAL EXAM====================    GENERAL: NAD  HEENT: Normocephalic;  conjunctivae and sclerae clear; moist mucous membranes;   NECK : supple  CHEST/LUNG: Clear to auscultation bilaterally with good air entry   HEART: S1 S2  regular; no murmurs, gallops or rubs  ABDOMEN: Soft, Nontender, Nondistended; Bowel sounds present  EXTREMITIES: no cyanosis; no edema; no calf tenderness  SKIN: warm and dry; no rash  NERVOUS SYSTEM:  Awake and alert; Oriented  to person only; no new deficits    ==============LABORATORIES======================  LABS:                        13.2   7.9   )-----------( 106      ( 2018 07:45 )             44.1     -    171<HH>  |  140<H>  |  21<H>  ----------------------------<  125<H>  3.9   |  26  |  1.30    Ca    7.8<L>      2018 07:45  Phos  1.4     -05  Mg     2.7     -    TPro  6.9  /  Alb  2.7<L>  /  TBili  0.8  /  DBili  x   /  AST  52<H>  /  ALT  20  /  AlkPhos  68  -05      Urinalysis Basic - ( 2018 14:58 )    Color: Yellow / Appearance: Slightly Turbid / S.010 / pH: x  Gluc: x / Ketone: Negative  / Bili: Negative / Urobili: Negative   Blood: x / Protein: 100 / Nitrite: Positive   Leuk Esterase: Moderate / RBC: 2-5 /HPF / WBC 26-50 /HPF   Sq Epi: x / Non Sq Epi: Moderate /HPF / Bacteria: Many /HPF      CAPILLARY BLOOD GLUCOSE          =============INPUTS/OUPUTS=====================        RADIOLOGY & ADDITIONAL TESTS:    Imaging Personally Reviewed:  YES    Consultant(s) Notes Reviewed:   YES    Care Discussed with Consultants : YES    Plan of care was discussed with patient  and /or primary care giver; all questions and concerns were addressed and care was aligned with patient's wishes. Time was allowed for questions that were answered to the best of my abilities

## 2018-01-05 NOTE — SWALLOW BEDSIDE ASSESSMENT ADULT - SWALLOW EVAL: RECOMMENDED FEEDING/EATING TECHNIQUES
no straws/small sips/bites/alternate food with liquid/maintain upright posture during/after eating for 30 mins/oral hygiene/allow for swallow between intakes/crush medication (when feasible)/position upright (90 degrees)

## 2018-01-06 LAB
ANION GAP SERPL CALC-SCNC: 5 MMOL/L — SIGNIFICANT CHANGE UP (ref 5–17)
ANION GAP SERPL CALC-SCNC: 7 MMOL/L — SIGNIFICANT CHANGE UP (ref 5–17)
BASOPHILS # BLD AUTO: 0.1 K/UL — SIGNIFICANT CHANGE UP (ref 0–0.2)
BASOPHILS NFR BLD AUTO: 1 % — SIGNIFICANT CHANGE UP (ref 0–2)
BUN SERPL-MCNC: 11 MG/DL — SIGNIFICANT CHANGE UP (ref 7–18)
BUN SERPL-MCNC: 14 MG/DL — SIGNIFICANT CHANGE UP (ref 7–18)
CALCIUM SERPL-MCNC: 7.6 MG/DL — LOW (ref 8.4–10.5)
CALCIUM SERPL-MCNC: 7.9 MG/DL — LOW (ref 8.4–10.5)
CHLORIDE SERPL-SCNC: 128 MMOL/L — HIGH (ref 96–108)
CHLORIDE SERPL-SCNC: 135 MMOL/L — HIGH (ref 96–108)
CO2 SERPL-SCNC: 25 MMOL/L — SIGNIFICANT CHANGE UP (ref 22–31)
CO2 SERPL-SCNC: 28 MMOL/L — SIGNIFICANT CHANGE UP (ref 22–31)
CREAT SERPL-MCNC: 0.9 MG/DL — SIGNIFICANT CHANGE UP (ref 0.5–1.3)
CREAT SERPL-MCNC: 1.02 MG/DL — SIGNIFICANT CHANGE UP (ref 0.5–1.3)
EOSINOPHIL # BLD AUTO: 0.2 K/UL — SIGNIFICANT CHANGE UP (ref 0–0.5)
EOSINOPHIL NFR BLD AUTO: 2.9 % — SIGNIFICANT CHANGE UP (ref 0–6)
GLUCOSE SERPL-MCNC: 119 MG/DL — HIGH (ref 70–99)
GLUCOSE SERPL-MCNC: 154 MG/DL — HIGH (ref 70–99)
HCT VFR BLD CALC: 38.8 % — SIGNIFICANT CHANGE UP (ref 34.5–45)
HGB BLD-MCNC: 12.4 G/DL — SIGNIFICANT CHANGE UP (ref 11.5–15.5)
LYMPHOCYTES # BLD AUTO: 1.4 K/UL — SIGNIFICANT CHANGE UP (ref 1–3.3)
LYMPHOCYTES # BLD AUTO: 20.8 % — SIGNIFICANT CHANGE UP (ref 13–44)
MCHC RBC-ENTMCNC: 31.6 PG — SIGNIFICANT CHANGE UP (ref 27–34)
MCHC RBC-ENTMCNC: 32 GM/DL — SIGNIFICANT CHANGE UP (ref 32–36)
MCV RBC AUTO: 98.6 FL — SIGNIFICANT CHANGE UP (ref 80–100)
MONOCYTES # BLD AUTO: 0.4 K/UL — SIGNIFICANT CHANGE UP (ref 0–0.9)
MONOCYTES NFR BLD AUTO: 5.7 % — SIGNIFICANT CHANGE UP (ref 2–14)
NEUTROPHILS # BLD AUTO: 4.8 K/UL — SIGNIFICANT CHANGE UP (ref 1.8–7.4)
NEUTROPHILS NFR BLD AUTO: 69.7 % — SIGNIFICANT CHANGE UP (ref 43–77)
PLATELET # BLD AUTO: 104 K/UL — LOW (ref 150–400)
POTASSIUM SERPL-MCNC: 2.9 MMOL/L — CRITICAL LOW (ref 3.5–5.3)
POTASSIUM SERPL-MCNC: 3.4 MMOL/L — LOW (ref 3.5–5.3)
POTASSIUM SERPL-SCNC: 2.9 MMOL/L — CRITICAL LOW (ref 3.5–5.3)
POTASSIUM SERPL-SCNC: 3.4 MMOL/L — LOW (ref 3.5–5.3)
RBC # BLD: 3.93 M/UL — SIGNIFICANT CHANGE UP (ref 3.8–5.2)
RBC # FLD: 14.1 % — SIGNIFICANT CHANGE UP (ref 10.3–14.5)
SODIUM SERPL-SCNC: 161 MMOL/L — CRITICAL HIGH (ref 135–145)
SODIUM SERPL-SCNC: 167 MMOL/L — CRITICAL HIGH (ref 135–145)
WBC # BLD: 6.9 K/UL — SIGNIFICANT CHANGE UP (ref 3.8–10.5)
WBC # FLD AUTO: 6.9 K/UL — SIGNIFICANT CHANGE UP (ref 3.8–10.5)

## 2018-01-06 RX ORDER — DEXTROSE MONOHYDRATE, SODIUM CHLORIDE, AND POTASSIUM CHLORIDE 50; .745; 4.5 G/1000ML; G/1000ML; G/1000ML
1000 INJECTION, SOLUTION INTRAVENOUS
Qty: 0 | Refills: 0 | Status: DISCONTINUED | OUTPATIENT
Start: 2018-01-06 | End: 2018-01-07

## 2018-01-06 RX ORDER — POTASSIUM CHLORIDE 20 MEQ
40 PACKET (EA) ORAL ONCE
Qty: 0 | Refills: 0 | Status: COMPLETED | OUTPATIENT
Start: 2018-01-06 | End: 2018-01-06

## 2018-01-06 RX ADMIN — CARBIDOPA AND LEVODOPA 1 TABLET(S): 25; 100 TABLET ORAL at 13:18

## 2018-01-06 RX ADMIN — Medication 25 MICROGRAM(S): at 05:59

## 2018-01-06 RX ADMIN — Medication 1 APPLICATION(S): at 17:51

## 2018-01-06 RX ADMIN — CARBIDOPA AND LEVODOPA 1 TABLET(S): 25; 100 TABLET ORAL at 05:59

## 2018-01-06 RX ADMIN — Medication 40 MILLIEQUIVALENT(S): at 17:19

## 2018-01-06 RX ADMIN — Medication 1 APPLICATION(S): at 06:03

## 2018-01-06 RX ADMIN — Medication 1 APPLICATION(S): at 06:02

## 2018-01-06 RX ADMIN — CARBIDOPA AND LEVODOPA 1 TABLET(S): 25; 100 TABLET ORAL at 21:56

## 2018-01-06 RX ADMIN — Medication 25 MILLIGRAM(S): at 21:56

## 2018-01-06 RX ADMIN — MEMANTINE HYDROCHLORIDE 5 MILLIGRAM(S): 10 TABLET ORAL at 05:59

## 2018-01-06 RX ADMIN — Medication 3 MILLILITER(S): at 21:21

## 2018-01-06 RX ADMIN — HEPARIN SODIUM 5000 UNIT(S): 5000 INJECTION INTRAVENOUS; SUBCUTANEOUS at 06:02

## 2018-01-06 RX ADMIN — MEMANTINE HYDROCHLORIDE 5 MILLIGRAM(S): 10 TABLET ORAL at 17:51

## 2018-01-06 RX ADMIN — HEPARIN SODIUM 5000 UNIT(S): 5000 INJECTION INTRAVENOUS; SUBCUTANEOUS at 17:51

## 2018-01-06 NOTE — CHART NOTE - NSCHARTNOTEFT_GEN_A_CORE
Upon Nutritional Assessment by the Registered Dietitian your patient was determined to meet criteria / has evidence of the following diagnosis/diagnoses:          [ ]  Mild Protein Calorie Malnutrition        [ X ]  Moderate Protein Calorie Malnutrition        [ ] Severe Protein Calorie Malnutrition        [ ] Unspecified Protein Calorie Malnutrition        [ ] Underweight / BMI <19        [ ] Morbid Obesity / BMI > 40      Findings as based on:  •  Comprehensive nutrition assessment and consultation  •  Calorie counts (nutrient intake analysis)  •  Food acceptance and intake status from observations by staff  •  Follow up  •  Patient education  •  Intervention secondary to interdisciplinary rounds  •   concerns      Treatment:    The following diet has been recommended: Add Ensure Pudding 120ml x tid (510kcal 12g protein) if medically feasible; Swallow evaluation as medically feasible       PROVIDER Section:     By signing this assessment you are acknowledging and agree with the diagnosis/diagnoses assigned by the Registered Dietitian    Comments:

## 2018-01-06 NOTE — PHYSICAL THERAPY INITIAL EVALUATION ADULT - REHAB POTENTIAL, PT EVAL
patient will be placed in TRIAL PT for 1 WEEK pending participation in PT activities and FUNCTIONAL PROGRESS/fair, will monitor progress closely

## 2018-01-06 NOTE — DIETITIAN INITIAL EVALUATION ADULT. - NUTRITION INTERVENTION
Collaboration and Referral of Nutrition Care/Meals and Snack/Medical Food Supplements/Discharge and Transfer of Nutrition Care to New Setting/Feeding Assistance Vitamin

## 2018-01-06 NOTE — DIETITIAN INITIAL EVALUATION ADULT. - OTHER INFO
nutrition consult requested for BMI < 18; from skilled nursing facility; pressure ulcer stage I x 1, II x 2, skin wound/tear noted; poor oral intake reported at present

## 2018-01-06 NOTE — PHYSICAL THERAPY INITIAL EVALUATION ADULT - TRANSFER SAFETY CONCERNS NOTED: BED/CHAIR, REHAB EVAL
decreased weight-shifting ability/decreased balance during turns/decreased safety awareness/losing balance/stand pivot/decreased step length

## 2018-01-06 NOTE — DIETITIAN INITIAL EVALUATION ADULT. - ETIOLOGY
inadequate intake with acute on chronic comorbidities, dehydration, cognitive deficit with dementia, increased needs for wound

## 2018-01-06 NOTE — DIETITIAN INITIAL EVALUATION ADULT. - PROBLEM SELECTOR PLAN 1
Patient presented with lethargy and weakness with UA positive for WBC 26-50 with many bacteria, positive nitrites and leucocyte esterase.   Lactate 2.5 s/p 900 ml NS bolus improved to 1.7  Continue Rocephin and follow urine cultures per MD

## 2018-01-06 NOTE — DIETITIAN INITIAL EVALUATION ADULT. - MD RECOMMEND
Add Ensure Pudding 120ml x tid (510kcal 12g protein) if medically feasible; Swallow evaluation as medically feasible

## 2018-01-06 NOTE — DIETITIAN INITIAL EVALUATION ADULT. - PROBLEM SELECTOR PLAN 2
Na is 174, secondary to dehydration secondary to sepsis  Free water deficit is 3L. Patient already received 900 ml NS bolus. Started D5 at 75 ml/hr as per medical attending. Follow BMP q8. Nephro: Dr Matamoros per MD

## 2018-01-06 NOTE — PROGRESS NOTE ADULT - ASSESSMENT
89 F from [facility] with PMH of OA, b12 deficiency hypothyroid, asthma, depression, parkinsonism, alzheimer's psoriasis and hypernatremia was sent from NH to ED for lethargy and weakness from last few days, associated with poor appetite.    Admitted for Hypernatremia and UTI

## 2018-01-06 NOTE — DIETITIAN INITIAL EVALUATION ADULT. - FACTORS AFF FOOD INTAKE
change in mental status/difficulty feeding self/difficulty chewing/difficulty swallowing/difficulty with food procurement/preparation/persistent lack of appetite

## 2018-01-06 NOTE — PROGRESS NOTE ADULT - SUBJECTIVE AND OBJECTIVE BOX
pt seen and examined.    SUBJECTIVE:  pt with ams/dementiam, non verbal and in nad    REVIEW OF SYSTEMS:  Unobtainable    Current meds:    ALBUTerol/ipratropium for Nebulization 3 milliLiter(s) Nebulizer every 6 hours  betamethasone valerate 0.1% Cream 1 Application(s) Topical two times a day  carbidopa/levodopa  25/100 1 Tablet(s) Oral three times a day  cefTRIAXone   IVPB 1 Gram(s) IV Intermittent every 24 hours  dextrose 5%. 1000 milliLiter(s) IV Continuous <Continuous>  fluocinonide 0.05% Cream 1 Application(s) Topical two times a day  heparin  Injectable 5000 Unit(s) SubCutaneous every 12 hours  levothyroxine 25 MICROGram(s) Oral daily  memantine 5 milliGRAM(s) Oral two times a day  potassium chloride   Powder 40 milliEquivalent(s) Oral once  traZODone 25 milliGRAM(s) Oral at bedtime      Vital Signs    T(F): 97.7 (18 @ 13:51), Max: 97.8 (18 @ 21:30)  HR: 73 (18 @ 13:51) (73 - 100)  BP: 109/61 (18 @ 13:51) (103/78 - 109/61)  ABP: --  RR: 16 (18 @ 13:51) (16 - 16)  SpO2: 96% (18 @ 13:51) (94% - 98%)  Wt(kg): --  CVP(cm H2O): --  CO: --  PCWP: --    I and O's:     @ 07:01  -   @ 07:00  --------------------------------------------------------  IN:    dextrose 5%.: 225 mL    dextrose 5%.: 700 mL  Total IN: 925 mL    OUT:  Total OUT: 0 mL    Total NET: 925 mL        Daily     Daily Weight in k.1 (2018 12:16)    PHYSICAL EXAM:  Constitutional: well developed, Mal-nourished  and in nad  HEENT: PERRLA,  no icteric sclera and mild pallor of conjunctiva noted  Neck: No JVD, thyromegaly or adenopathy  Respiratory: reduced air entry lower lungs with no rales, wheezing or rhonchi  Cardiovascular: S1 and S2 normally heard  Gastrointestinal: soft, nondistended, nontender and normal bowel sounds heard  Extremities: No peripheral edema or cyanosis  Neurological: arousable, non verbal , non focal on gross exam  : No flank tenderness  Skin: No rashes      LABS:    CBC:                          12.4   6.9   )-----------( 104      ( 2018 07:24 )             38.8           BMP:        167<HH>  |  135<H>  |  14  ----------------------------<  119<H>  3.4<L>   |  25  |  1.02  05    169<HH>  |  138<H>  |  16  ----------------------------<  109<H>  3.7   |  23  |  1.05  05    171<HH>  |  140<H>  |  21<H>  ----------------------------<  125<H>  3.9   |  26  |  1.30  05    174<HH>  |  141<H>  |  25<H>  ----------------------------<  140<H>  3.4<L>   |  27  |  1.37<H>      176<HH>  |  143<H>  |  21<H>  ----------------------------<  102<H>  3.2<L>   |  24  |  1.18  -04    174<HH>  |  141<H>  |  26<H>  ----------------------------<  123<H>  2.9<LL>   |  27  |  1.37<H>    Ca    7.9<L>      2018 07:24  Ca    7.7<L>      2018 17:40  Ca    7.8<L>      2018 07:45  Ca    7.7<L>      2018 00:15  Ca    7.5<L>      2018 17:15  Ca    8.1<L>      2018 14:25  Phos  1.4       Mg     2.7         TPro  6.9  /  Alb  2.7<L>  /  TBili  0.8  /  DBili  x   /  AST  52<H>  /  ALT  20  /  AlkPhos  68    TPro  7.2  /  Alb  2.8<L>  /  TBili  0.7  /  DBili  x   /  AST  52<H>  /  ALT  54  /  AlkPhos  68        Thyroid Stimulating Hormone, Serum: 4.01 uU/mL ( @ 07:45) pt seen and examined.    SUBJECTIVE:  pt with ams/dementia, non verbal and in nad    REVIEW OF SYSTEMS:  Unobtainable    Current meds:    ALBUTerol/ipratropium for Nebulization 3 milliLiter(s) Nebulizer every 6 hours  betamethasone valerate 0.1% Cream 1 Application(s) Topical two times a day  carbidopa/levodopa  25/100 1 Tablet(s) Oral three times a day  cefTRIAXone   IVPB 1 Gram(s) IV Intermittent every 24 hours  dextrose 5%. 1000 milliLiter(s) IV Continuous <Continuous>  fluocinonide 0.05% Cream 1 Application(s) Topical two times a day  heparin  Injectable 5000 Unit(s) SubCutaneous every 12 hours  levothyroxine 25 MICROGram(s) Oral daily  memantine 5 milliGRAM(s) Oral two times a day  potassium chloride   Powder 40 milliEquivalent(s) Oral once  traZODone 25 milliGRAM(s) Oral at bedtime      Vital Signs    T(F): 97.7 (18 @ 13:51), Max: 97.8 (18 @ 21:30)  HR: 73 (18 @ 13:51) (73 - 100)  BP: 109/61 (18 @ 13:51) (103/78 - 109/61)  ABP: --  RR: 16 (18 @ 13:51) (16 - 16)  SpO2: 96% (18 @ 13:51) (94% - 98%)  Wt(kg): --  CVP(cm H2O): --  CO: --  PCWP: --    I and O's:     @ 07:01  -   @ 07:00  --------------------------------------------------------  IN:    dextrose 5%.: 225 mL    dextrose 5%.: 700 mL  Total IN: 925 mL    OUT:  Total OUT: 0 mL    Total NET: 925 mL        Daily     Daily Weight in k.1 (2018 12:16)    PHYSICAL EXAM:  Constitutional: well developed, Mal-nourished  and in nad  HEENT: PERRLA,  no icteric sclera and mild pallor of conjunctiva noted  Neck: No JVD, thyromegaly or adenopathy  Respiratory: reduced air entry lower lungs with no rales, wheezing or rhonchi  Cardiovascular: S1 and S2 normally heard  Gastrointestinal: soft, nondistended, nontender and normal bowel sounds heard  Extremities: No peripheral edema or cyanosis  Neurological: arousable, non verbal , non focal on gross exam  : No flank tenderness  Skin: No rashes      LABS:    CBC:                          12.4   6.9   )-----------( 104      ( 2018 07:24 )             38.8           BMP:        167<HH>  |  135<H>  |  14  ----------------------------<  119<H>  3.4<L>   |  25  |  1.02  05    169<HH>  |  138<H>  |  16  ----------------------------<  109<H>  3.7   |  23  |  1.05  05    171<HH>  |  140<H>  |  21<H>  ----------------------------<  125<H>  3.9   |  26  |  1.30  05    174<HH>  |  141<H>  |  25<H>  ----------------------------<  140<H>  3.4<L>   |  27  |  1.37<H>      176<HH>  |  143<H>  |  21<H>  ----------------------------<  102<H>  3.2<L>   |  24  |  1.18  04    174<HH>  |  141<H>  |  26<H>  ----------------------------<  123<H>  2.9<LL>   |  27  |  1.37<H>    Ca    7.9<L>      2018 07:24  Ca    7.7<L>      2018 17:40  Ca    7.8<L>      2018 07:45  Ca    7.7<L>      2018 00:15  Ca    7.5<L>      2018 17:15  Ca    8.1<L>      2018 14:25  Phos  1.4       Mg     2.7         TPro  6.9  /  Alb  2.7<L>  /  TBili  0.8  /  DBili  x   /  AST  52<H>  /  ALT  20  /  AlkPhos  68    TPro  7.2  /  Alb  2.8<L>  /  TBili  0.7  /  DBili  x   /  AST  52<H>  /  ALT  54  /  AlkPhos  68        Thyroid Stimulating Hormone, Serum: 4.01 uU/mL ( @ 07:45)

## 2018-01-06 NOTE — PHYSICAL THERAPY INITIAL EVALUATION ADULT - ADDITIONAL COMMENTS
Unable to obtain history from patient due to lethargy,per SNF transfer paper, patient needs assistance in all ADLs

## 2018-01-07 LAB
ANION GAP SERPL CALC-SCNC: 9 MMOL/L — SIGNIFICANT CHANGE UP (ref 5–17)
ANION GAP SERPL CALC-SCNC: 9 MMOL/L — SIGNIFICANT CHANGE UP (ref 5–17)
BUN SERPL-MCNC: 7 MG/DL — SIGNIFICANT CHANGE UP (ref 7–18)
BUN SERPL-MCNC: 9 MG/DL — SIGNIFICANT CHANGE UP (ref 7–18)
CALCIUM SERPL-MCNC: 7.6 MG/DL — LOW (ref 8.4–10.5)
CALCIUM SERPL-MCNC: 7.7 MG/DL — LOW (ref 8.4–10.5)
CHLORIDE SERPL-SCNC: 116 MMOL/L — HIGH (ref 96–108)
CHLORIDE SERPL-SCNC: 120 MMOL/L — HIGH (ref 96–108)
CO2 SERPL-SCNC: 26 MMOL/L — SIGNIFICANT CHANGE UP (ref 22–31)
CO2 SERPL-SCNC: 26 MMOL/L — SIGNIFICANT CHANGE UP (ref 22–31)
CREAT SERPL-MCNC: 0.89 MG/DL — SIGNIFICANT CHANGE UP (ref 0.5–1.3)
CREAT SERPL-MCNC: 0.91 MG/DL — SIGNIFICANT CHANGE UP (ref 0.5–1.3)
CULTURE RESULTS: NO GROWTH — SIGNIFICANT CHANGE UP
GLUCOSE SERPL-MCNC: 134 MG/DL — HIGH (ref 70–99)
GLUCOSE SERPL-MCNC: 141 MG/DL — HIGH (ref 70–99)
POTASSIUM SERPL-MCNC: 3.1 MMOL/L — LOW (ref 3.5–5.3)
POTASSIUM SERPL-MCNC: 3.4 MMOL/L — LOW (ref 3.5–5.3)
POTASSIUM SERPL-SCNC: 3.1 MMOL/L — LOW (ref 3.5–5.3)
POTASSIUM SERPL-SCNC: 3.4 MMOL/L — LOW (ref 3.5–5.3)
SODIUM SERPL-SCNC: 151 MMOL/L — HIGH (ref 135–145)
SODIUM SERPL-SCNC: 155 MMOL/L — HIGH (ref 135–145)
SPECIMEN SOURCE: SIGNIFICANT CHANGE UP

## 2018-01-07 RX ORDER — POTASSIUM CHLORIDE 20 MEQ
10 PACKET (EA) ORAL
Qty: 0 | Refills: 0 | Status: COMPLETED | OUTPATIENT
Start: 2018-01-07 | End: 2018-01-07

## 2018-01-07 RX ORDER — DEXTROSE MONOHYDRATE, SODIUM CHLORIDE, AND POTASSIUM CHLORIDE 50; .745; 4.5 G/1000ML; G/1000ML; G/1000ML
1000 INJECTION, SOLUTION INTRAVENOUS
Qty: 0 | Refills: 0 | Status: DISCONTINUED | OUTPATIENT
Start: 2018-01-07 | End: 2018-01-07

## 2018-01-07 RX ORDER — DEXTROSE MONOHYDRATE, SODIUM CHLORIDE, AND POTASSIUM CHLORIDE 50; .745; 4.5 G/1000ML; G/1000ML; G/1000ML
1000 INJECTION, SOLUTION INTRAVENOUS
Qty: 0 | Refills: 0 | Status: DISCONTINUED | OUTPATIENT
Start: 2018-01-07 | End: 2018-01-08

## 2018-01-07 RX ADMIN — CEFTRIAXONE 100 GRAM(S): 500 INJECTION, POWDER, FOR SOLUTION INTRAMUSCULAR; INTRAVENOUS at 00:00

## 2018-01-07 RX ADMIN — HEPARIN SODIUM 5000 UNIT(S): 5000 INJECTION INTRAVENOUS; SUBCUTANEOUS at 18:02

## 2018-01-07 RX ADMIN — MEMANTINE HYDROCHLORIDE 5 MILLIGRAM(S): 10 TABLET ORAL at 18:02

## 2018-01-07 RX ADMIN — Medication 1 APPLICATION(S): at 18:02

## 2018-01-07 RX ADMIN — HEPARIN SODIUM 5000 UNIT(S): 5000 INJECTION INTRAVENOUS; SUBCUTANEOUS at 06:35

## 2018-01-07 RX ADMIN — Medication 100 MILLIEQUIVALENT(S): at 10:57

## 2018-01-07 RX ADMIN — CARBIDOPA AND LEVODOPA 1 TABLET(S): 25; 100 TABLET ORAL at 13:44

## 2018-01-07 RX ADMIN — Medication 100 MILLIEQUIVALENT(S): at 14:30

## 2018-01-07 RX ADMIN — CARBIDOPA AND LEVODOPA 1 TABLET(S): 25; 100 TABLET ORAL at 22:01

## 2018-01-07 RX ADMIN — Medication 100 MILLIEQUIVALENT(S): at 13:02

## 2018-01-07 RX ADMIN — DEXTROSE MONOHYDRATE, SODIUM CHLORIDE, AND POTASSIUM CHLORIDE 60 MILLILITER(S): 50; .745; 4.5 INJECTION, SOLUTION INTRAVENOUS at 22:00

## 2018-01-07 RX ADMIN — Medication 1 APPLICATION(S): at 06:35

## 2018-01-07 RX ADMIN — DEXTROSE MONOHYDRATE, SODIUM CHLORIDE, AND POTASSIUM CHLORIDE 80 MILLILITER(S): 50; .745; 4.5 INJECTION, SOLUTION INTRAVENOUS at 10:46

## 2018-01-07 RX ADMIN — Medication 25 MILLIGRAM(S): at 22:01

## 2018-01-07 RX ADMIN — DEXTROSE MONOHYDRATE, SODIUM CHLORIDE, AND POTASSIUM CHLORIDE 100 MILLILITER(S): 50; .745; 4.5 INJECTION, SOLUTION INTRAVENOUS at 06:35

## 2018-01-07 NOTE — PROGRESS NOTE ADULT - SUBJECTIVE AND OBJECTIVE BOX
pt seen and examined.    SUBJECTIVE:  pt with ams/dementia, non verbal and in nad    REVIEW OF SYSTEMS:  Unobtainable    Current meds:reviewed    Vital Signs: noted    PHYSICAL EXAM:  Constitutional: well developed, Mal-nourished  and in nad  HEENT: PERRLA,  no icteric sclera and mild pallor of conjunctiva noted  Neck: No JVD, thyromegaly or adenopathy  Respiratory: reduced air entry lower lungs with no rales, wheezing or rhonchi  Cardiovascular: S1 and S2 normally heard  Gastrointestinal: soft, nondistended, nontender and normal bowel sounds heard  Extremities: No peripheral edema or cyanosis  Neurological: arousable, non verbal , non focal on gross exam  : No flank tenderness  Skin: No rashes      LABS:    CBC:  Complete Blood Count + Automated Diff in AM (01.06.18 @ 07:24)    WBC Count: 6.9 K/uL    RBC Count: 3.93 M/uL    Hemoglobin: 12.4 g/dL    Hematocrit: 38.8 %    Mean Cell Volume: 98.6 fl    Mean Cell Hemoglobin: 31.6 pg    Mean Cell Hemoglobin Conc: 32.0 gm/dL    Red Cell Distrib Width: 14.1 %    Platelet Count - Automated: 104 K/uL                   12.4   6.9   )-----------( 104      ( 06 Jan 2018 07:24 )             38.8           BMP:  Basic Metabolic Panel (01.07.18 @ 17:19)    Sodium, Serum: 151 mmol/L    Potassium, Serum: 3.4 mmol/L    Chloride, Serum: 116 mmol/L    Carbon Dioxide, Serum: 26 mmol/L    Anion Gap, Serum: 9 mmol/L    Blood Urea Nitrogen, Serum: 7 mg/dL    Creatinine, Serum: 0.89 mg/dL    Glucose, Serum: 141 mg/dL    Calcium, Total Serum: 7.7 mg/dL    eGFR if Non : 57: Interpretative comment    eGFR if : 66 mL/min/1.73M2    Basic Metabolic Panel in AM (01.07.18 @ 07:15)    Sodium, Serum: 155 mmol/L    Potassium, Serum: 3.1 mmol/L    Chloride, Serum: 120 mmol/L    Carbon Dioxide, Serum: 26 mmol/L    Anion Gap, Serum: 9 mmol/L    Blood Urea Nitrogen, Serum: 9 mg/dL    Creatinine, Serum: 0.91 mg/dL    Glucose, Serum: 134 mg/dL    Calcium, Total Serum: 7.6 mg/dL    eGFR if Non : 56: Interpretative comment    eGFR if : 64 mL/min/1.73M2      01-06    167<HH>  |  135<H>  |  14  ----------------------------<  119<H>  3.4<L>   |  25  |  1.02  01-05    169<HH>  |  138<H>  |  16  ----------------------------<  109<H>  3.7   |  23  |  1.05  01-05    171<HH>  |  140<H>  |  21<H>  ----------------------------<  125<H>  3.9   |  26  |  1.30  01-05    174<HH>  |  141<H>  |  25<H>  ----------------------------<  140<H>  3.4<L>   |  27  |  1.37<H>  01-04    176<HH>  |  143<H>  |  21<H>  ----------------------------<  102<H>  3.2<L>   |  24  |  1.18  01-04    174<HH>  |  141<H>  |  26<H>  ----------------------------<  123<H>  2.9<LL>   |  27  |  1.37<H>    Ca    7.9<L>      06 Jan 2018 07:24  Ca    7.7<L>      05 Jan 2018 17:40  Ca    7.8<L>      05 Jan 2018 07:45  Ca    7.7<L>      05 Jan 2018 00:15  Ca    7.5<L>      04 Jan 2018 17:15  Ca    8.1<L>      04 Jan 2018 14:25  Phos  1.4     01-05  Mg     2.7     01-05    TPro  6.9  /  Alb  2.7<L>  /  TBili  0.8  /  DBili  x   /  AST  52<H>  /  ALT  20  /  AlkPhos  68  01-05  TPro  7.2  /  Alb  2.8<L>  /  TBili  0.7  /  DBili  x   /  AST  52<H>  /  ALT  54  /  AlkPhos  68  01-04      Thyroid Stimulating Hormone, Serum: 4.01 uU/mL (01-05 @ 07:45)

## 2018-01-07 NOTE — PROGRESS NOTE ADULT - ASSESSMENT
89 F from [facility] with PMH of OA, b12 deficiency hypothyroid, asthma, depression, parkinsonism, alzheimer's psoriasis and hypernatremia was sent from NH to ED for lethargy and weakness from last few days, associated with poor appetite.    Admitted for Hypernatremia and UTI

## 2018-01-07 NOTE — PROGRESS NOTE ADULT - SUBJECTIVE AND OBJECTIVE BOX
Medical attending follow up.    ================CHIEF COMPLAINT===============  Patient is a 89y old  Female who presents with a chief complaint of Lethargy and weakness (2018 19:43)        =========INTERVAL HPI/OVERNIGHT EVENTS=========  Offers no new complaints; current symptoms resolving      ============CURRENT MEDICATIONS===============  Vital Signs Last 24 Hrs  T(C): 36.5 (2018 14:06), Max: 36.7 (2018 22:10)  T(F): 97.7 (2018 14:06), Max: 98.1 (2018 22:10)  HR: 84 (2018 14:06) (72 - 84)  BP: 107/77 (2018 14:06) (96/51 - 118/58)  BP(mean): --  RR: 18 (2018 14:06) (16 - 18)  SpO2: 99% (2018 14:06) (94% - 99%)  MEDICATIONS  (STANDING):  ALBUTerol/ipratropium for Nebulization 3 milliLiter(s) Nebulizer every 6 hours  betamethasone valerate 0.1% Cream 1 Application(s) Topical two times a day  carbidopa/levodopa  25/100 1 Tablet(s) Oral three times a day  cefTRIAXone   IVPB 1 Gram(s) IV Intermittent every 24 hours  dextrose 5%. 1000 milliLiter(s) (100 mL/Hr) IV Continuous <Continuous>  fluocinonide 0.05% Cream 1 Application(s) Topical two times a day  heparin  Injectable 5000 Unit(s) SubCutaneous every 12 hours  levothyroxine 25 MICROGram(s) Oral daily  memantine 5 milliGRAM(s) Oral two times a day  traZODone 25 milliGRAM(s) Oral at bedtime    MEDICATIONS  (PRN):        ============REVIEW OF SYSTEMS==================    CONSTITUTIONAL: No fever  EYES: no acute visual disturbances  NECK: No pain or stiffness  RESPIRATORY: No cough; No shortness of breath  CARDIOVASCULAR: No chest pain, no palpitations  GASTROINTESTINAL: No pain. No nausea or vomiting; No diarrhea   NEUROLOGICAL: No headache or numbness, no tremors  MUSCULOSKELETAL: No joint pain, no muscle pain  GENITOURINARY: no dysuria, no frequency, no hesitancy  PSYCHIATRY: no depression , no anxiety  ALL OTHER  ROS negative      ================VITALS SIGNS=====================    ===============PHYSICAL EXAM====================    GENERAL: NAD  HEENT: Normocephalic;  conjunctivae and sclerae clear; moist mucous membranes;   NECK : supple  CHEST/LUNG: Clear to auscultation bilaterally with good air entry   HEART: S1 S2  regular; no murmurs, gallops or rubs  ABDOMEN: Soft, Nontender, Nondistended; Bowel sounds present  EXTREMITIES: no cyanosis; no edema; no calf tenderness  SKIN: warm and dry; no rash  NERVOUS SYSTEM:  Awake and alert; Oriented  to person only; no new deficits    ==============LABORATORIES======================  LABS:                        12.4   6.9   )-----------( 104      ( 2018 07:24 )             38.8   01-07    155<H>  |  120<H>  |  9   ----------------------------<  134<H>  3.1<L>   |  26  |  0.91    Ca    7.6<L>      2018 07:15        TPro  6.9  /  Alb  2.7<L>  /  TBili  0.8  /  DBili  x   /  AST  52<H>  /  ALT  20  /  AlkPhos  68  01-05      Urinalysis Basic - ( 2018 14:58 )    Color: Yellow / Appearance: Slightly Turbid / S.010 / pH: x  Gluc: x / Ketone: Negative  / Bili: Negative / Urobili: Negative   Blood: x / Protein: 100 / Nitrite: Positive   Leuk Esterase: Moderate / RBC: 2-5 /HPF / WBC 26-50 /HPF   Sq Epi: x / Non Sq Epi: Moderate /HPF / Bacteria: Many /HPF      CAPILLARY BLOOD GLUCOSE          =============INPUTS/OUPUTS=====================    18 @ 07:01  -  18 @ 07:00  --------------------------------------------------------  IN: 925 mL / OUT: 0 mL / NET: 925 mL          RADIOLOGY & ADDITIONAL TESTS:    Imaging Personally Reviewed:  YES    Consultant(s) Notes Reviewed:   YES    Care Discussed with Consultants : YES    Plan of care was discussed with patient  and /or primary care giver; all questions and concerns were addressed and care was aligned with patient's wishes. Time was allowed for questions that were answered to the best of my abilities

## 2018-01-08 LAB
ANION GAP SERPL CALC-SCNC: 8 MMOL/L — SIGNIFICANT CHANGE UP (ref 5–17)
ANION GAP SERPL CALC-SCNC: 8 MMOL/L — SIGNIFICANT CHANGE UP (ref 5–17)
BUN SERPL-MCNC: 6 MG/DL — LOW (ref 7–18)
BUN SERPL-MCNC: 6 MG/DL — LOW (ref 7–18)
CALCIUM SERPL-MCNC: 7.5 MG/DL — LOW (ref 8.4–10.5)
CALCIUM SERPL-MCNC: 7.9 MG/DL — LOW (ref 8.4–10.5)
CHLORIDE SERPL-SCNC: 112 MMOL/L — HIGH (ref 96–108)
CHLORIDE SERPL-SCNC: 114 MMOL/L — HIGH (ref 96–108)
CO2 SERPL-SCNC: 26 MMOL/L — SIGNIFICANT CHANGE UP (ref 22–31)
CO2 SERPL-SCNC: 27 MMOL/L — SIGNIFICANT CHANGE UP (ref 22–31)
CREAT SERPL-MCNC: 0.79 MG/DL — SIGNIFICANT CHANGE UP (ref 0.5–1.3)
CREAT SERPL-MCNC: 0.82 MG/DL — SIGNIFICANT CHANGE UP (ref 0.5–1.3)
GLUCOSE SERPL-MCNC: 140 MG/DL — HIGH (ref 70–99)
GLUCOSE SERPL-MCNC: 144 MG/DL — HIGH (ref 70–99)
POTASSIUM SERPL-MCNC: 2.9 MMOL/L — CRITICAL LOW (ref 3.5–5.3)
POTASSIUM SERPL-MCNC: 3.2 MMOL/L — LOW (ref 3.5–5.3)
POTASSIUM SERPL-SCNC: 2.9 MMOL/L — CRITICAL LOW (ref 3.5–5.3)
POTASSIUM SERPL-SCNC: 3.2 MMOL/L — LOW (ref 3.5–5.3)
SODIUM SERPL-SCNC: 147 MMOL/L — HIGH (ref 135–145)
SODIUM SERPL-SCNC: 148 MMOL/L — HIGH (ref 135–145)

## 2018-01-08 RX ORDER — POTASSIUM CHLORIDE 20 MEQ
10 PACKET (EA) ORAL ONCE
Qty: 0 | Refills: 0 | Status: COMPLETED | OUTPATIENT
Start: 2018-01-08 | End: 2018-01-08

## 2018-01-08 RX ORDER — DEXTROSE MONOHYDRATE, SODIUM CHLORIDE, AND POTASSIUM CHLORIDE 50; .745; 4.5 G/1000ML; G/1000ML; G/1000ML
1000 INJECTION, SOLUTION INTRAVENOUS
Qty: 0 | Refills: 0 | Status: DISCONTINUED | OUTPATIENT
Start: 2018-01-08 | End: 2018-01-09

## 2018-01-08 RX ORDER — DEXTROSE MONOHYDRATE, SODIUM CHLORIDE, AND POTASSIUM CHLORIDE 50; .745; 4.5 G/1000ML; G/1000ML; G/1000ML
1000 INJECTION, SOLUTION INTRAVENOUS
Qty: 0 | Refills: 0 | Status: DISCONTINUED | OUTPATIENT
Start: 2018-01-08 | End: 2018-01-08

## 2018-01-08 RX ADMIN — HEPARIN SODIUM 5000 UNIT(S): 5000 INJECTION INTRAVENOUS; SUBCUTANEOUS at 06:58

## 2018-01-08 RX ADMIN — DEXTROSE MONOHYDRATE, SODIUM CHLORIDE, AND POTASSIUM CHLORIDE 60 MILLILITER(S): 50; .745; 4.5 INJECTION, SOLUTION INTRAVENOUS at 12:21

## 2018-01-08 RX ADMIN — Medication 3 MILLILITER(S): at 20:56

## 2018-01-08 RX ADMIN — Medication 1 APPLICATION(S): at 17:10

## 2018-01-08 RX ADMIN — Medication 3 MILLILITER(S): at 08:37

## 2018-01-08 RX ADMIN — Medication 3 MILLILITER(S): at 14:12

## 2018-01-08 RX ADMIN — HEPARIN SODIUM 5000 UNIT(S): 5000 INJECTION INTRAVENOUS; SUBCUTANEOUS at 17:10

## 2018-01-08 RX ADMIN — Medication 1 APPLICATION(S): at 06:59

## 2018-01-08 RX ADMIN — CEFTRIAXONE 100 GRAM(S): 500 INJECTION, POWDER, FOR SOLUTION INTRAMUSCULAR; INTRAVENOUS at 01:16

## 2018-01-08 RX ADMIN — Medication 100 MILLIEQUIVALENT(S): at 12:21

## 2018-01-08 NOTE — PROGRESS NOTE ADULT - ASSESSMENT
89 F from [facility] with PMH of OA, b12 deficiency hypothyroid, asthma, depression, parkinsonism, alzheimer's psoriasis and hypernatremia was sent from NH to ED for lethargy and weakness from last few days, associated with poor appetite.    Admitted for Hypernatremia and UTI

## 2018-01-08 NOTE — PROGRESS NOTE ADULT - SUBJECTIVE AND OBJECTIVE BOX
pt seen and examined.    SUBJECTIVE:  pt with ams/dementia, non verbal and in nad    REVIEW OF SYSTEMS:  Unobtainable    Current meds:reviewed    Vital Signs: noted    PHYSICAL EXAM:  Constitutional: well developed, Mal-nourished  and in nad  HEENT: PERRLA,  no icteric sclera and mild pallor of conjunctiva noted  Neck: No JVD, thyromegaly or adenopathy  Respiratory: reduced air entry lower lungs with no rales, wheezing or rhonchi  Cardiovascular: S1 and S2 normally heard  Gastrointestinal: soft, nondistended, nontender and normal bowel sounds heard  Extremities: No peripheral edema or cyanosis  Neurological: arousable, non verbal , non focal on gross exam  : No flank tenderness  Skin: No rashes      LABS:    CBC:  no new cbc today  Complete Blood Count + Automated Diff in AM (01.06.18 @ 07:24)    WBC Count: 6.9 K/uL    RBC Count: 3.93 M/uL    Hemoglobin: 12.4 g/dL    Hematocrit: 38.8 %    Mean Cell Volume: 98.6 fl    Mean Cell Hemoglobin: 31.6 pg    Mean Cell Hemoglobin Conc: 32.0 gm/dL    Red Cell Distrib Width: 14.1 %    Platelet Count - Automated: 104 K/uL                   12.4   6.9   )-----------( 104      ( 06 Jan 2018 07:24 )             38.8           BMP:  Basic Metabolic Panel (01.08.18 @ 11:06)    Sodium, Serum: 148 mmol/L    Potassium, Serum: 2.9: TYPE:(C=Critical, N=Notification, A=Abnormal) C  TESTS: K  DATE/TIME CALLED: 01/08/18 12:27  CALLED TO: GEOVANY GAGNON RN  READ BACK (2 Patient Identifiers)(Y/N): Y  READ BACK VALUES (Y/N): Y  CALLED BY: CRISTOFER MCCALL01/08/18 12:29 mmol/L    Chloride, Serum: 114 mmol/L    Carbon Dioxide, Serum: 26 mmol/L    Anion Gap, Serum: 8 mmol/L    Blood Urea Nitrogen, Serum: 6 mg/dL    Creatinine, Serum: 0.82 mg/dL    Glucose, Serum: 144 mg/dL    Calcium, Total Serum: 7.9 mg/dL    eGFR if Non : 63: Interpretative comment    eGFR if : 73 mL/min/1.73M2      Basic Metabolic Panel (01.07.18 @ 17:19)    Sodium, Serum: 151 mmol/L    Potassium, Serum: 3.4 mmol/L    Chloride, Serum: 116 mmol/L    Carbon Dioxide, Serum: 26 mmol/L    Anion Gap, Serum: 9 mmol/L    Blood Urea Nitrogen, Serum: 7 mg/dL    Creatinine, Serum: 0.89 mg/dL    Glucose, Serum: 141 mg/dL    Calcium, Total Serum: 7.7 mg/dL    eGFR if Non : 57: Interpretative comment    eGFR if : 66 mL/min/1.73M2    Basic Metabolic Panel in AM (01.07.18 @ 07:15)    Sodium, Serum: 155 mmol/L    Potassium, Serum: 3.1 mmol/L    Chloride, Serum: 120 mmol/L    Carbon Dioxide, Serum: 26 mmol/L    Anion Gap, Serum: 9 mmol/L    Blood Urea Nitrogen, Serum: 9 mg/dL    Creatinine, Serum: 0.91 mg/dL    Glucose, Serum: 134 mg/dL    Calcium, Total Serum: 7.6 mg/dL    eGFR if Non : 56: Interpretative comment    eGFR if : 64 mL/min/1.73M2      01-06    167<HH>  |  135<H>  |  14  ----------------------------<  119<H>  3.4<L>   |  25  |  1.02  01-05    169<HH>  |  138<H>  |  16  ----------------------------<  109<H>  3.7   |  23  |  1.05  01-05    171<HH>  |  140<H>  |  21<H>  ----------------------------<  125<H>  3.9   |  26  |  1.30  01-05    174<HH>  |  141<H>  |  25<H>  ----------------------------<  140<H>  3.4<L>   |  27  |  1.37<H>  01-04    176<HH>  |  143<H>  |  21<H>  ----------------------------<  102<H>  3.2<L>   |  24  |  1.18  01-04    174<HH>  |  141<H>  |  26<H>  ----------------------------<  123<H>  2.9<LL>   |  27  |  1.37<H>    Ca    7.9<L>      06 Jan 2018 07:24  Ca    7.7<L>      05 Jan 2018 17:40  Ca    7.8<L>      05 Jan 2018 07:45  Ca    7.7<L>      05 Jan 2018 00:15  Ca    7.5<L>      04 Jan 2018 17:15  Ca    8.1<L>      04 Jan 2018 14:25  Phos  1.4     01-05  Mg     2.7     01-05    TPro  6.9  /  Alb  2.7<L>  /  TBili  0.8  /  DBili  x   /  AST  52<H>  /  ALT  20  /  AlkPhos  68  01-05  TPro  7.2  /  Alb  2.8<L>  /  TBili  0.7  /  DBili  x   /  AST  52<H>  /  ALT  54  /  AlkPhos  68  01-04      Thyroid Stimulating Hormone, Serum: 4.01 uU/mL (01-05 @ 07:45)

## 2018-01-08 NOTE — DISCHARGE NOTE ADULT - HOSPITAL COURSE
Background  and course of admission:    89 F from [facility] with PMH of OA, b12 deficiency hypothyroid, asthma, depression, parkinsonism, alzheimer's psoriasis and hypernatremia was sent from NH to ED for lethargy and weakness from last few days, associated with poor appetite.    Admitted for Hypernatremia and UTI. Treated in the hospital with IV antibiotics. Blood culture yielded negative results and Urine culture was found to show negative. Hypernatremia secondary to Infection and Poor oral intake. Managed during the admission with IV fluids and Free water intake.PATIENT NEEDS TO BE MONITORED FOR ORAL INTAKE SINCE SHE IS DEMENTED. WILL NEED AT LEAST 250ML OF WATER IN EVERY FEEDING IN ORDER TO PREVENT HYPERNATREMIA. Will complete a course of Ceftin 250mg PO BID for _____.    Given patient's improved clinical status and current hemodynamic stability, decision was made to discharge.  Please refer to patient's complete medical chart with documents for a full hospital course, for this is only a brief summary. Background  and course of admission:    89 F from [facility] with PMH of OA, b12 deficiency hypothyroid, asthma, depression, parkinsonism, alzheimer's psoriasis and hypernatremia was sent from NH to ED for lethargy and weakness from last few days, associated with poor appetite.    Admitted for Hypernatremia and UTI. Treated in the hospital with IV antibiotics. Blood culture yielded negative results and Urine culture was found to show negative. Hypernatremia secondary to Infection and Poor oral intake. Managed during the admission with IV fluids and Free water intake.PATIENT NEEDS TO BE MONITORED FOR ORAL INTAKE SINCE SHE IS DEMENTED. WILL NEED AT LEAST 250ML OF WATER IN EVERY FEEDING IN ORDER TO PREVENT HYPERNATREMIA. Will complete a course of Ceftin 250mg PO BID for 1 more day.    Given patient's improved clinical status and current hemodynamic stability, decision was made to discharge.  Please refer to patient's complete medical chart with documents for a full hospital course, for this is only a brief summary.

## 2018-01-08 NOTE — DISCHARGE NOTE ADULT - CARE PROVIDER_API CALL
Rudi Lino), Internal Medicine  [address] [place] [address]  Phone: [phone]  Fax: [phone]

## 2018-01-08 NOTE — DISCHARGE NOTE ADULT - PATIENT PORTAL LINK FT
“You can access the [employer] Patient Portal, offered by [facility], by registering with the following website: [email or web address]”

## 2018-01-08 NOTE — DISCHARGE NOTE ADULT - PLAN OF CARE
Treated in the hospital with IV antibiotics. Will continue a course of oral antibiotics as instructed in the medication reconciliation list. Blood culture yielded negative results and Urine culture was found to show negative. Please follow your primary care physician between one week of discharge to inform of recent hospitalization. Secondary to Infection and Poor oral intake. Managed during the admission with IV fluids and Free water intake.  PATIENT NEEDS TO BE MONITORED FOR ORAL INTAKE SINCE SHE IS DEMENTED. WILL NEED AT LEAST 250ML OF WATER IN EVERY FEEDING IN ORDER TO PREVENT HYPERNATREMIA. Managed with Managed with IV fluids with Potassium replacement Not in any acute exacerbation during admission  Continue with your nebulizers as intrusted by your primary care physician. Avoid triggers for asthma. If you're allergic to dust mites, pollens or molds, they can make your asthma symptoms get worse. Cold air, exercise, fumes from chemicals or perfume, tobacco or wood smoke, and weather changes can also make asthma symptoms worse. So can common colds and sinus infections. Vaccinate with influenza vaccine yearly. Follow up with primary care physician one week after discharge. Continue with your synthroid medication. Get constant follow up with TSH levels with you primary care physician for medication adjustment if needed. Continue with your Parkinson medications Continue with Memantine Complete antibiotics for 1 more day Treated in the hospital with IV antibiotics. Will continue a course of oral antibiotics for 1 more days. Ceftin 250mg PO BID to complete the course of 7 days. Blood culture yielded negative results and Urine culture was found to show negative. Please follow your primary care physician between one week of discharge to inform of recent hospitalization. Secondary to Infection and Poor oral intake. Managed during the admission with IV fluids and Free water intake. PATIENT NEEDS TO BE MONITORED FOR ORAL INTAKE SINCE SHE IS DEMENTED. WILL NEED AT LEAST 250ML OF WATER IN EVERY FEEDING IN ORDER TO PREVENT HYPERNATREMIA.

## 2018-01-08 NOTE — DISCHARGE NOTE ADULT - CARE PLAN
Principal Discharge DX:	UTI (urinary tract infection)  Instructions for follow-up, activity and diet:	Treated in the hospital with IV antibiotics. Will continue a course of oral antibiotics as instructed in the medication reconciliation list. Blood culture yielded negative results and Urine culture was found to show negative. Please follow your primary care physician between one week of discharge to inform of recent hospitalization.  Secondary Diagnosis:	Hypernatremia  Instructions for follow-up, activity and diet:	Secondary to Infection and Poor oral intake. Managed during the admission with IV fluids and Free water intake.  PATIENT NEEDS TO BE MONITORED FOR ORAL INTAKE SINCE SHE IS DEMENTED. WILL NEED AT LEAST 250ML OF WATER IN EVERY FEEDING IN ORDER TO PREVENT HYPERNATREMIA.  Secondary Diagnosis:	Hypokalemia  Instructions for follow-up, activity and diet:	Managed with  Secondary Diagnosis:	Asthma  Secondary Diagnosis:	Hypothyroidism  Secondary Diagnosis:	Parkinsonism  Secondary Diagnosis:	Dementia Principal Discharge DX:	UTI (urinary tract infection)  Instructions for follow-up, activity and diet:	Treated in the hospital with IV antibiotics. Will continue a course of oral antibiotics as instructed in the medication reconciliation list. Blood culture yielded negative results and Urine culture was found to show negative. Please follow your primary care physician between one week of discharge to inform of recent hospitalization.  Secondary Diagnosis:	Hypernatremia  Instructions for follow-up, activity and diet:	Secondary to Infection and Poor oral intake. Managed during the admission with IV fluids and Free water intake.  PATIENT NEEDS TO BE MONITORED FOR ORAL INTAKE SINCE SHE IS DEMENTED. WILL NEED AT LEAST 250ML OF WATER IN EVERY FEEDING IN ORDER TO PREVENT HYPERNATREMIA.  Secondary Diagnosis:	Hypokalemia  Instructions for follow-up, activity and diet:	Managed with IV fluids with Potassium replacement  Secondary Diagnosis:	Asthma  Instructions for follow-up, activity and diet:	Not in any acute exacerbation during admission  Continue with your nebulizers as intrusted by your primary care physician. Avoid triggers for asthma. If you're allergic to dust mites, pollens or molds, they can make your asthma symptoms get worse. Cold air, exercise, fumes from chemicals or perfume, tobacco or wood smoke, and weather changes can also make asthma symptoms worse. So can common colds and sinus infections. Vaccinate with influenza vaccine yearly. Follow up with primary care physician one week after discharge.  Secondary Diagnosis:	Hypothyroidism  Instructions for follow-up, activity and diet:	Continue with your synthroid medication. Get constant follow up with TSH levels with you primary care physician for medication adjustment if needed.  Secondary Diagnosis:	Parkinsonism  Instructions for follow-up, activity and diet:	Continue with your Parkinson medications  Secondary Diagnosis:	Dementia  Instructions for follow-up, activity and diet:	Continue with Memantine Principal Discharge DX:	UTI (urinary tract infection)  Goal:	Complete antibiotics for 1 more day  Instructions for follow-up, activity and diet:	Treated in the hospital with IV antibiotics. Will continue a course of oral antibiotics for 1 more days. Ceftin 250mg PO BID to complete the course of 7 days. Blood culture yielded negative results and Urine culture was found to show negative. Please follow your primary care physician between one week of discharge to inform of recent hospitalization.  Secondary Diagnosis:	Hypernatremia  Instructions for follow-up, activity and diet:	Secondary to Infection and Poor oral intake. Managed during the admission with IV fluids and Free water intake. PATIENT NEEDS TO BE MONITORED FOR ORAL INTAKE SINCE SHE IS DEMENTED. WILL NEED AT LEAST 250ML OF WATER IN EVERY FEEDING IN ORDER TO PREVENT HYPERNATREMIA.  Secondary Diagnosis:	Hypokalemia  Instructions for follow-up, activity and diet:	Managed with IV fluids with Potassium replacement  Secondary Diagnosis:	Asthma  Instructions for follow-up, activity and diet:	Not in any acute exacerbation during admission  Continue with your nebulizers as intrusted by your primary care physician. Avoid triggers for asthma. If you're allergic to dust mites, pollens or molds, they can make your asthma symptoms get worse. Cold air, exercise, fumes from chemicals or perfume, tobacco or wood smoke, and weather changes can also make asthma symptoms worse. So can common colds and sinus infections. Vaccinate with influenza vaccine yearly. Follow up with primary care physician one week after discharge.  Secondary Diagnosis:	Hypothyroidism  Instructions for follow-up, activity and diet:	Continue with your synthroid medication. Get constant follow up with TSH levels with you primary care physician for medication adjustment if needed.  Secondary Diagnosis:	Parkinsonism  Instructions for follow-up, activity and diet:	Continue with your Parkinson medications  Secondary Diagnosis:	Dementia  Instructions for follow-up, activity and diet:	Continue with Memantine Principal Discharge DX:	UTI (urinary tract infection)  Goal:	Complete antibiotics for 1 more day  Assessment and plan of treatment:	Treated in the hospital with IV antibiotics. Will continue a course of oral antibiotics for 1 more days. Ceftin 250mg PO BID to complete the course of 7 days. Blood culture yielded negative results and Urine culture was found to show negative. Please follow your primary care physician between one week of discharge to inform of recent hospitalization.  Secondary Diagnosis:	Hypernatremia  Assessment and plan of treatment:	Secondary to Infection and Poor oral intake. Managed during the admission with IV fluids and Free water intake. PATIENT NEEDS TO BE MONITORED FOR ORAL INTAKE SINCE SHE IS DEMENTED. WILL NEED AT LEAST 250ML OF WATER IN EVERY FEEDING IN ORDER TO PREVENT HYPERNATREMIA.  Secondary Diagnosis:	Hypokalemia  Assessment and plan of treatment:	Managed with IV fluids with Potassium replacement  Secondary Diagnosis:	Asthma  Assessment and plan of treatment:	Not in any acute exacerbation during admission  Continue with your nebulizers as intrusted by your primary care physician. Avoid triggers for asthma. If you're allergic to dust mites, pollens or molds, they can make your asthma symptoms get worse. Cold air, exercise, fumes from chemicals or perfume, tobacco or wood smoke, and weather changes can also make asthma symptoms worse. So can common colds and sinus infections. Vaccinate with influenza vaccine yearly. Follow up with primary care physician one week after discharge.  Secondary Diagnosis:	Hypothyroidism  Assessment and plan of treatment:	Continue with your synthroid medication. Get constant follow up with TSH levels with you primary care physician for medication adjustment if needed.  Secondary Diagnosis:	Parkinsonism  Assessment and plan of treatment:	Continue with your Parkinson medications  Secondary Diagnosis:	Dementia  Assessment and plan of treatment:	Continue with Memantine

## 2018-01-08 NOTE — DISCHARGE NOTE ADULT - VISION (WITH CORRECTIVE LENSES IF THE PATIENT USUALLY WEARS THEM):
Partially impaired: cannot see medication labels or newsprint, but can see obstacles in path, and the surrounding layout; can count fingers at arm's length/glaucoma

## 2018-01-08 NOTE — PROGRESS NOTE ADULT - SUBJECTIVE AND OBJECTIVE BOX
==================PGY 1 Note===================   Discussed with supervising resident and primary attending    ================CHIEF COMPLAINT===============  Patient is a 89y old  Female who presents with a chief complaint of Lethargy and weakness (04 Jan 2018 19:43)        =========INTERVAL HPI/OVERNIGHT EVENTS=========  Offers no new complaints; current symptoms resolving      ============CURRENT MEDICATIONS===============    MEDICATIONS  (STANDING):  ALBUTerol/ipratropium for Nebulization 3 milliLiter(s) Nebulizer every 6 hours  betamethasone valerate 0.1% Cream 1 Application(s) Topical two times a day  carbidopa/levodopa  25/100 1 Tablet(s) Oral three times a day  cefTRIAXone   IVPB 1 Gram(s) IV Intermittent every 24 hours  dextrose 5% with potassium chloride 20 mEq/L 1000 milliLiter(s) (60 mL/Hr) IV Continuous <Continuous>  fluocinonide 0.05% Cream 1 Application(s) Topical two times a day  heparin  Injectable 5000 Unit(s) SubCutaneous every 12 hours  levothyroxine 25 MICROGram(s) Oral daily  memantine 5 milliGRAM(s) Oral two times a day  traZODone 25 milliGRAM(s) Oral at bedtime    MEDICATIONS  (PRN):        ============REVIEW OF SYSTEMS==================    CONSTITUTIONAL: No fever  EYES: no acute visual disturbances  NECK: No pain or stiffness  RESPIRATORY: No cough; No shortness of breath  CARDIOVASCULAR: No chest pain, no palpitations  GASTROINTESTINAL: No pain. No nausea or vomiting; No diarrhea   NEUROLOGICAL: No headache or numbness, no tremors  MUSCULOSKELETAL: No joint pain, no muscle pain  GENITOURINARY: no dysuria, no frequency, no hesitancy  PSYCHIATRY: no depression , no anxiety  ALL OTHER  ROS negative      ================VITALS SIGNS=====================  Vital Signs Last 24 Hrs  T(C): 36.4 (08 Jan 2018 05:20), Max: 36.6 (07 Jan 2018 22:57)  T(F): 97.5 (08 Jan 2018 05:20), Max: 97.9 (07 Jan 2018 22:57)  HR: 76 (08 Jan 2018 05:20) (76 - 84)  BP: 107/57 (08 Jan 2018 05:20) (96/61 - 107/77)  BP(mean): --  RR: 15 (08 Jan 2018 05:20) (15 - 18)  SpO2: 98% (08 Jan 2018 05:20) (95% - 99%)    ===============PHYSICAL EXAM====================    GENERAL: NAD  HEENT: Normocephalic;  conjunctivae and sclerae clear; moist mucous membranes;   NECK : supple  CHEST/LUNG: Clear to auscultation bilaterally with good air entry   HEART: S1 S2  regular; no murmurs, gallops or rubs  ABDOMEN: Soft, Nontender, Nondistended; Bowel sounds present  EXTREMITIES: no cyanosis; no edema; no calf tenderness  SKIN: warm and dry; no rash  NERVOUS SYSTEM:  Awake and alert; Oriented  to person ; no new deficits    ==============LABORATORIES======================  LABS:    01-08    148<H>  |  114<H>  |  6<L>  ----------------------------<  144<H>  2.9<LL>   |  26  |  0.82    Ca    7.9<L>      08 Jan 2018 11:06          CAPILLARY BLOOD GLUCOSE          =============INPUTS/OUPUTS=====================    01-07-18 @ 07:01  -  01-08-18 @ 07:00  --------------------------------------------------------  IN: 0 mL / OUT: 1 mL / NET: -1 mL          RADIOLOGY & ADDITIONAL TESTS:    Imaging Personally Reviewed:  YES    Consultant(s) Notes Reviewed:   YES    Care Discussed with Consultants : YES    Plan of care was discussed with patient  and /or primary care giver; all questions and concerns were addressed and care was aligned with patient's wishes. Time was allowed for questions that were answered to the best of my abilities

## 2018-01-08 NOTE — DISCHARGE NOTE ADULT - MEDICATION SUMMARY - MEDICATIONS TO TAKE
I will START or STAY ON the medications listed below when I get home from the hospital:    traZODone 50 mg oral tablet  -- 0.5 tab(s) by mouth once a day (at bedtime)  -- Indication: For Antidepressant    Sinemet 25 mg-100 mg oral tablet  -- 1 tab(s) by mouth 3 times a day  -- Indication: For Parkinsonism    Ventolin Nebules 2.5 mg/3 mL (0.083%) inhalation solution  -- 3 milliliter(s) inhaled every 6 hours  -- Indication: For Asthma    Ceftin 250 mg oral tablet  -- 1 tab(s) by mouth 2 times a day   -- Finish all this medication unless otherwise directed by prescriber.  Medication should be taken with plenty of water.  Take with food or milk.    -- Indication: For UTI (urinary tract infection)    Dovonex 0.005% topical ointment  -- Apply on skin to affected area once a day  -- Indication: For Topical oitment    mometasone 0.1% topical cream  -- Apply on skin to affected area once a day  -- Indication: For Topical oitment    Nizoral A-D 1% topical shampoo  -- Apply on skin to affected area every 3 days  -- Indication: For Topical oitment    Diprolene AF 0.05% topical cream  -- Apply on skin to affected area 2 times a day  -- Indication: For Topical oitment    Namenda 5 mg oral tablet  -- 1 tab(s) by mouth 2 times a day  -- Indication: For Dementia    Synthroid 25 mcg (0.025 mg) oral tablet  -- 1 tab(s) by mouth once a day  -- Indication: For Hypothyroidism

## 2018-01-09 VITALS — SYSTOLIC BLOOD PRESSURE: 83 MMHG | DIASTOLIC BLOOD PRESSURE: 51 MMHG

## 2018-01-09 LAB
ANION GAP SERPL CALC-SCNC: 8 MMOL/L — SIGNIFICANT CHANGE UP (ref 5–17)
BUN SERPL-MCNC: 6 MG/DL — LOW (ref 7–18)
CALCIUM SERPL-MCNC: 7.9 MG/DL — LOW (ref 8.4–10.5)
CHLORIDE SERPL-SCNC: 114 MMOL/L — HIGH (ref 96–108)
CO2 SERPL-SCNC: 24 MMOL/L — SIGNIFICANT CHANGE UP (ref 22–31)
CREAT SERPL-MCNC: 0.7 MG/DL — SIGNIFICANT CHANGE UP (ref 0.5–1.3)
CULTURE RESULTS: SIGNIFICANT CHANGE UP
CULTURE RESULTS: SIGNIFICANT CHANGE UP
GLUCOSE SERPL-MCNC: 119 MG/DL — HIGH (ref 70–99)
MAGNESIUM SERPL-MCNC: 2 MG/DL — SIGNIFICANT CHANGE UP (ref 1.6–2.6)
PHOSPHATE SERPL-MCNC: 2.7 MG/DL — SIGNIFICANT CHANGE UP (ref 2.5–4.5)
POTASSIUM SERPL-MCNC: 3.4 MMOL/L — LOW (ref 3.5–5.3)
POTASSIUM SERPL-SCNC: 3.4 MMOL/L — LOW (ref 3.5–5.3)
SODIUM SERPL-SCNC: 146 MMOL/L — HIGH (ref 135–145)
SPECIMEN SOURCE: SIGNIFICANT CHANGE UP
SPECIMEN SOURCE: SIGNIFICANT CHANGE UP

## 2018-01-09 PROCEDURE — 99285 EMERGENCY DEPT VISIT HI MDM: CPT | Mod: 25

## 2018-01-09 PROCEDURE — 87040 BLOOD CULTURE FOR BACTERIA: CPT

## 2018-01-09 PROCEDURE — 84100 ASSAY OF PHOSPHORUS: CPT

## 2018-01-09 PROCEDURE — 92610 EVALUATE SWALLOWING FUNCTION: CPT

## 2018-01-09 PROCEDURE — 71045 X-RAY EXAM CHEST 1 VIEW: CPT

## 2018-01-09 PROCEDURE — 87581 M.PNEUMON DNA AMP PROBE: CPT

## 2018-01-09 PROCEDURE — 80053 COMPREHEN METABOLIC PANEL: CPT

## 2018-01-09 PROCEDURE — 94640 AIRWAY INHALATION TREATMENT: CPT

## 2018-01-09 PROCEDURE — 87086 URINE CULTURE/COLONY COUNT: CPT

## 2018-01-09 PROCEDURE — 85027 COMPLETE CBC AUTOMATED: CPT

## 2018-01-09 PROCEDURE — 83605 ASSAY OF LACTIC ACID: CPT

## 2018-01-09 PROCEDURE — 83930 ASSAY OF BLOOD OSMOLALITY: CPT

## 2018-01-09 PROCEDURE — 80048 BASIC METABOLIC PNL TOTAL CA: CPT

## 2018-01-09 PROCEDURE — 82607 VITAMIN B-12: CPT

## 2018-01-09 PROCEDURE — 83036 HEMOGLOBIN GLYCOSYLATED A1C: CPT

## 2018-01-09 PROCEDURE — 84443 ASSAY THYROID STIM HORMONE: CPT

## 2018-01-09 PROCEDURE — 70450 CT HEAD/BRAIN W/O DYE: CPT

## 2018-01-09 PROCEDURE — 83735 ASSAY OF MAGNESIUM: CPT

## 2018-01-09 PROCEDURE — 87798 DETECT AGENT NOS DNA AMP: CPT

## 2018-01-09 PROCEDURE — 80061 LIPID PANEL: CPT

## 2018-01-09 PROCEDURE — 81001 URINALYSIS AUTO W/SCOPE: CPT

## 2018-01-09 PROCEDURE — 87486 CHLMYD PNEUM DNA AMP PROBE: CPT

## 2018-01-09 PROCEDURE — 87633 RESP VIRUS 12-25 TARGETS: CPT

## 2018-01-09 PROCEDURE — 97162 PT EVAL MOD COMPLEX 30 MIN: CPT

## 2018-01-09 PROCEDURE — 93005 ELECTROCARDIOGRAM TRACING: CPT

## 2018-01-09 RX ORDER — POTASSIUM CHLORIDE 20 MEQ
40 PACKET (EA) ORAL ONCE
Qty: 0 | Refills: 0 | Status: COMPLETED | OUTPATIENT
Start: 2018-01-09 | End: 2018-01-09

## 2018-01-09 RX ORDER — CEFUROXIME AXETIL 250 MG
1 TABLET ORAL
Qty: 2 | Refills: 0 | OUTPATIENT
Start: 2018-01-09 | End: 2018-01-09

## 2018-01-09 RX ADMIN — CEFTRIAXONE 100 GRAM(S): 500 INJECTION, POWDER, FOR SOLUTION INTRAMUSCULAR; INTRAVENOUS at 00:02

## 2018-01-09 RX ADMIN — Medication 3 MILLILITER(S): at 09:13

## 2018-01-09 RX ADMIN — HEPARIN SODIUM 5000 UNIT(S): 5000 INJECTION INTRAVENOUS; SUBCUTANEOUS at 06:17

## 2018-01-09 RX ADMIN — Medication 3 MILLILITER(S): at 03:05

## 2018-01-09 RX ADMIN — DEXTROSE MONOHYDRATE, SODIUM CHLORIDE, AND POTASSIUM CHLORIDE 50 MILLILITER(S): 50; .745; 4.5 INJECTION, SOLUTION INTRAVENOUS at 00:03

## 2018-01-09 RX ADMIN — Medication 1 APPLICATION(S): at 06:17

## 2018-01-09 NOTE — PROGRESS NOTE ADULT - SUBJECTIVE AND OBJECTIVE BOX
==================PGY 1 Note===================   Discussed with supervising resident and primary attending    ================CHIEF COMPLAINT===============  Patient is a 89y old  Female who presents with a chief complaint of Lethargy and weakness (08 Jan 2018 15:07)        =========INTERVAL HPI/OVERNIGHT EVENTS=========  Offers no new complaints; current symptoms resolving      ============CURRENT MEDICATIONS===============    MEDICATIONS  (STANDING):  ALBUTerol/ipratropium for Nebulization 3 milliLiter(s) Nebulizer every 6 hours  betamethasone valerate 0.1% Cream 1 Application(s) Topical two times a day  carbidopa/levodopa  25/100 1 Tablet(s) Oral three times a day  cefTRIAXone   IVPB 1 Gram(s) IV Intermittent every 24 hours  dextrose 5% with potassium chloride 20 mEq/L 1000 milliLiter(s) (50 mL/Hr) IV Continuous <Continuous>  fluocinonide 0.05% Cream 1 Application(s) Topical two times a day  heparin  Injectable 5000 Unit(s) SubCutaneous every 12 hours  levothyroxine 25 MICROGram(s) Oral daily  memantine 5 milliGRAM(s) Oral two times a day  potassium chloride   Powder 40 milliEquivalent(s) Oral once  traZODone 25 milliGRAM(s) Oral at bedtime    MEDICATIONS  (PRN):        ============REVIEW OF SYSTEMS==================    CONSTITUTIONAL: No fever  EYES: no acute visual disturbances  NECK: No pain or stiffness  RESPIRATORY: No cough; No shortness of breath  CARDIOVASCULAR: No chest pain, no palpitations  GASTROINTESTINAL: No pain. No nausea or vomiting; No diarrhea   NEUROLOGICAL: No headache or numbness, no tremors  MUSCULOSKELETAL: No joint pain, no muscle pain  GENITOURINARY: no dysuria, no frequency, no hesitancy  PSYCHIATRY: no depression , no anxiety  ALL OTHER  ROS negative      ================VITALS SIGNS=====================  Vital Signs Last 24 Hrs  T(C): 37 (09 Jan 2018 05:35), Max: 37.1 (08 Jan 2018 13:37)  T(F): 98.6 (09 Jan 2018 05:35), Max: 98.8 (08 Jan 2018 13:37)  HR: 82 (09 Jan 2018 05:35) (80 - 108)  BP: 87/54 (09 Jan 2018 05:35) (87/54 - 101/73)  BP(mean): --  RR: 17 (09 Jan 2018 05:35) (16 - 17)  SpO2: 99% (09 Jan 2018 05:35) (95% - 99%)    ===============PHYSICAL EXAM====================    GENERAL: NAD  HEENT: Normocephalic;  conjunctivae and sclerae clear; moist mucous membranes;   NECK : supple  CHEST/LUNG: Clear to auscultation bilaterally with good air entry   HEART: S1 S2  regular; no murmurs, gallops or rubs  ABDOMEN: Soft, Nontender, Nondistended; Bowel sounds present  EXTREMITIES: no cyanosis; no edema; no calf tenderness  SKIN: warm and dry; no rash  NERVOUS SYSTEM:  Awake and alert; Oriented  to person only   ==============LABORATORIES======================  LABS:    01-09    146<H>  |  114<H>  |  6<L>  ----------------------------<  119<H>  3.4<L>   |  24  |  0.70    Ca    7.9<L>      09 Jan 2018 08:00          CAPILLARY BLOOD GLUCOSE          =============INPUTS/OUPUTS=====================    01-08-18 @ 07:01  -  01-09-18 @ 07:00  --------------------------------------------------------  IN: 660 mL / OUT: 0 mL / NET: 660 mL          RADIOLOGY & ADDITIONAL TESTS:    Imaging Personally Reviewed:  YES    Consultant(s) Notes Reviewed:   YES    Care Discussed with Consultants : YES    Plan of care was discussed with patient  and /or primary care giver; all questions and concerns were addressed and care was aligned with patient's wishes. Time was allowed for questions that were answered to the best of my abilities

## 2018-01-09 NOTE — PROGRESS NOTE ADULT - PROBLEM SELECTOR PROBLEM 3
Chronic kidney disease, unspecified CKD stage
Dementia

## 2018-01-09 NOTE — PROGRESS NOTE ADULT - PROBLEM SELECTOR PROBLEM 1
Hypernatremia
UTI (urinary tract infection)

## 2018-01-09 NOTE — PROGRESS NOTE ADULT - PROBLEM SELECTOR PROBLEM 2
CORIEN (acute kidney injury)
CORINE (acute kidney injury)
CORINE (acute kidney injury)
Hypernatremia

## 2018-01-09 NOTE — PROGRESS NOTE ADULT - ASSESSMENT
89 F from [facility] with PMH of OA, b12 deficiency hypothyroid, asthma, depression, parkinsonism, alzheimer's psoriasis and hypernatremia was sent from NH to ED for lethargy and weakness from last few days, associated with poor appetite.    Admitted for Hypernatremia and UTI

## 2018-01-09 NOTE — PROGRESS NOTE ADULT - PROBLEM SELECTOR PLAN 2
secondary to pre-renal azotemia , now improved and at baseline  -Keep patient euvolemic and renal diet  -Avoid Nephrotoxic Meds/ Agents such as (NSAIDs, IV contrast, Aminoglycosides such as gentamicin, -Gadolinium contrast, Phosphate containing enemas, etc..)  -Adjust Medications according to eGFR  -f/u bmp daily.
secondary to pre-renal azotemia with mild improvement last 24hrs  -Keep patient euvolemic and renal diet  -Avoid Nephrotoxic Meds/ Agents such as (NSAIDs, IV contrast, Aminoglycosides such as gentamicin, -Gadolinium contrast, Phosphate containing enemas, etc..)  -Adjust Medications according to eGFR  -f/u bmp daily.
secondary to pre-renal azotemia with mild improvement last 24hrs  -Keep patient euvolemic and renal diet  -Avoid Nephrotoxic Meds/ Agents such as (NSAIDs, IV contrast, Aminoglycosides such as gentamicin, -Gadolinium contrast, Phosphate containing enemas, etc..)  -Adjust Medications according to eGFR  -f/u bmp daily.
Secondary to dehydration and infectious process  Continue IV hydration and Free Water  Diet started Today and S/S evaluation was done Pureed and Honey Thick Liquids
Secondary to dehydration and infectious process  Improving 146  today  IMPORTANT AND NOTED IN D/H PAPERS THAT PATIENT NEED TO BE FED DUE TO HISTORY OF DEMENTIA. RECOMMENDED AT LEAST 250ML OF FREE WATER WITH EACH MEAL TO PREVENT THIS. PATIENT NEEDS TO BE MONITORED FOR ORAL INTAKE SINCE SHE PERSONALLY COOPERATES IN ORDER TO EAT.
Secondary to dehydration and infectious process  Improving 148  today  Continue IV hydration and Free Water
Secondary to dehydration and infectious process  Improving 171 --> 169  Continue IV hydration and Free Water  Diet started Today and S/S evaluation was done Pureed and Honey Thick Liquids
Secondary to dehydration and infectious process  Improving 171 --> 169  Continue IV hydration and Free Water  Diet started Today and S/S evaluation was done Pureed and Honey Thick Liquids

## 2018-01-09 NOTE — PROGRESS NOTE ADULT - PROBLEM SELECTOR PLAN 1
r/o acute on chronic vs chronic-duration is uk, now Na level is improving s  -secondary to poor po intake/failure to thrive plus sespis induced,   -suggest to f/u Na level q 12hrs  -reduce iv rate to 40 ml per hr x 5 hrs only today as pt is on free water too  -do not correct >8 meq per day  -please call me with NA LEVEL Q 12HRS.
r/o acute on chronic vs chronic-duration is uk, now Na level is improving slowly  -secondary to poor po intake/failure to thrive plus sespis induced,   -suggest to f/u Na level q 12hrs  -reduce iv rate to 60 ml per hr as Na is improving a little faster rate last 12 hrs  -do not correct >8 meq per day  -please call me with NA LEVEL Q 12HRS.
r/o acute on chronic vs chronic-duration is uk, now Na level is improving slowly  -secondary to poor po intake/failure to thrive plus sespis induced,   -suggest to f/u Na level q 12hrs on current iv fluid rate and adjust iv rate as per Na level  -do not correct >8 meq per day  -please call me with NA LEVEL Q 12HRS.
Managed with IV Rocephin  Pending U/C and B/C
Managed with IV Rocephin  Pending U/C and B/C  U/C contaminated pending repeated U/C
Managed with IV Rocephin  Pending U/C and B/C  U/C contaminated pending repeated U/C
Managed with IV Rocephin  Repeated U/C negative
Managed with IV Rocephin  Repeated U/C negative  Complete 1 more day of antibiotic. Ceftin 250mg PO bid upon discharge

## 2018-01-09 NOTE — PROGRESS NOTE ADULT - PROBLEM SELECTOR PROBLEM 4
Hypokalemia
Parkinsonism

## 2018-01-09 NOTE — PROGRESS NOTE ADULT - PROBLEM SELECTOR PLAN 4
-mild,replace po kcl prn  -keep k >4 and <5  -f/u k level daily.
-mild,replace po kcl prn  -keep k >4 and <5  -f/u k level daily.
-secondary to iv fluid and improved renal function  -replace po kcl prn  -keep k >4 and <5  -f/u k level daily.
Continue Sinemet

## 2018-01-09 NOTE — PROGRESS NOTE ADULT - PROBLEM SELECTOR PLAN 3
r/o secondary to htn/vascular ds with mild proteinuria  -Keep patient euvolemic and renal diet  -Avoid Nephrotoxic Meds/ Agents such as (NSAIDs, IV contrast, Aminoglycosides such as gentamicin, -Gadolinium contrast, Phosphate containing enemas, etc..)  -Adjust Medications according to eGFR  -no further w/u needed.
History of Dementia  Patient alert and active oriented to only person  Patient back to baseline  Continue to Manage with Namenda

## 2018-12-04 NOTE — PROGRESS NOTE ADULT - PROVIDER SPECIALTY LIST ADULT
Nephrology Telephone Encounter by Chikis Blackwell RN, BSN at 08/20/18 04:10 PM     Author:  Chikis Blackwell RN, BSN Service:  (none) Author Type:  Registered Nurse     Filed:  08/20/18 04:14 PM Encounter Date:  8/18/2018 Status:  Addendum     :  Chikis Blackwell RN, BSN (Registered Nurse)            Please refer to previous msgs.  Pt. states she has scheduled appt. with Camryn Granda 8/21/18.  Pt. states she has been out of med for 2 days.[NZ1.1M]  Pt. stated she is taking BCP to regulate menses.[NZ1.2M]    Routed to ADITI Ennis:  Please advise.[NZ1.1M]      Revision History        User Key Date/Time User Provider Type Action    > NZ1.2 08/20/18 04:14 PM Chikis Blackwell RN, BSN Registered Nurse Addend     NZ1.1 08/20/18 04:11 PM Chikis Blackwell RN, BSN Registered Nurse Sign    M - Manual

## 2023-01-01 NOTE — ED ADULT NURSE NOTE - CAS TRG GENERAL NORM CIRC DET
Prior to mri scan, dr. kasper has secured an additional iv site to patient's right foot so that labs can be obtained.  Labs have been obtained and processed by ron hector.  Mri scan now begins.   Capillary refill less/equal to 2 seconds/Strong peripheral pulses

## 2023-01-24 NOTE — H&P ADULT - NSTOBACCOSCREENHP_GEN_A_CS
Post-Care Instructions: I reviewed with the patient in detail post-care instructions. Patient is to wear sunprotection, and avoid picking at any of the treated lesions. Pt may apply Vaseline to crusted or scabbing areas. Spray Paint Technique: No No Spray Paint Text: The liquid nitrogen was applied to the skin utilizing a spray paint frosting technique. Application Tool (Optional): Liquid Nitrogen Sprayer Number Of Freeze-Thaw Cycles: 2 freeze-thaw cycles Consent: The patient's consent was obtained including but not limited to risks of crusting, scabbing, blistering, scarring, darker or lighter pigmentary change, recurrence, incomplete removal and infection. Show Applicator Variable?: Yes Medical Necessity Clause: This procedure was medically necessary because the lesions that were treated were: Detail Level: Simple Duration Of Freeze Thaw-Cycle (Seconds): 15-20 Medical Necessity Information: It is in your best interest to select a reason for this procedure from the list below. All of these items fulfill various CMS LCD requirements except the new and changing color options.

## 2024-01-25 NOTE — ED PROVIDER NOTE - CADM POA CENTRAL LINE
LVM to schedule Repeat Cervical 7-Thoracic 1 epidural steroid injection              Carol Henry  Complex   [facility]  Pain Management      No

## 2024-09-06 NOTE — PATIENT PROFILE ADULT. - VISION (WITH CORRECTIVE LENSES IF THE PATIENT USUALLY WEARS THEM):
stable stable to PACU unable to assess Partially impaired: cannot see medication labels or newsprint, but can see obstacles in path, and the surrounding layout; can count fingers at arm's length/glaucoma